# Patient Record
Sex: FEMALE | Race: WHITE | NOT HISPANIC OR LATINO | Employment: OTHER | URBAN - METROPOLITAN AREA
[De-identification: names, ages, dates, MRNs, and addresses within clinical notes are randomized per-mention and may not be internally consistent; named-entity substitution may affect disease eponyms.]

---

## 2017-01-03 ENCOUNTER — TELEPHONE (OUTPATIENT)
Dept: OBSTETRICS AND GYNECOLOGY | Facility: CLINIC | Age: 77
End: 2017-01-03

## 2017-01-03 NOTE — TELEPHONE ENCOUNTER
----- Message from Mariola Palacios sent at 1/3/2017  2:59 PM CST -----  Contact: pt   Patient states she is returning a call Patient can be reached at [phone].

## 2017-01-03 NOTE — TELEPHONE ENCOUNTER
----- Message from Mikayla Olmedo sent at 1/3/2017 12:38 PM CST -----  Contact: pt  x_  1st Request  _  2nd Request  _  3rd Request      Who:pt    Why:  Pt would like  to schedule annual appointment due to her  being ill she haven't been able to make her appointments.    What Number to Call Back: [phone]    When to Expect a call back: (Before the end of the day)   -- if call after 3:00 call back will be tomorrow.

## 2017-01-16 ENCOUNTER — TELEPHONE (OUTPATIENT)
Dept: OBSTETRICS AND GYNECOLOGY | Facility: CLINIC | Age: 77
End: 2017-01-16

## 2017-01-16 NOTE — TELEPHONE ENCOUNTER
Returning a call. Patient requested to be seen another day at an earlier time due to lack of staff at work. Patient scheduled 1/26/17 11:30am. Patient verbalized all understanding. No further questions asked.

## 2017-01-16 NOTE — TELEPHONE ENCOUNTER
----- Message from Karan Knapp sent at 1/16/2017 10:33 AM CST -----  Contact: pt  x_ 1st Request   _ 2nd Request   _ 3rd Request     Who: PATIENCE VENEGAS [[id number]]    Why: Pt is requesting a call back in reference to getting an earlier appointment    What Number to Call Back: [phone]    When to Expect a call back: (Before the end of the day)   -- if call after 3:00 call back will be tomorrow.

## 2017-01-26 ENCOUNTER — OFFICE VISIT (OUTPATIENT)
Dept: OBSTETRICS AND GYNECOLOGY | Facility: CLINIC | Age: 77
End: 2017-01-26
Attending: OBSTETRICS & GYNECOLOGY
Payer: MEDICARE

## 2017-01-26 VITALS
BODY MASS INDEX: 27.22 KG/M2 | WEIGHT: 144.19 LBS | SYSTOLIC BLOOD PRESSURE: 136 MMHG | DIASTOLIC BLOOD PRESSURE: 84 MMHG | HEIGHT: 61 IN

## 2017-01-26 DIAGNOSIS — Z78.0 POST-MENOPAUSAL: ICD-10-CM

## 2017-01-26 DIAGNOSIS — Z12.4 SCREENING FOR MALIGNANT NEOPLASM OF CERVIX: ICD-10-CM

## 2017-01-26 DIAGNOSIS — N95.2 POSTMENOPAUSAL ATROPHIC VAGINITIS: ICD-10-CM

## 2017-01-26 DIAGNOSIS — Z01.419 WELL WOMAN EXAM WITH ROUTINE GYNECOLOGICAL EXAM: Primary | ICD-10-CM

## 2017-01-26 DIAGNOSIS — Z79.890 POSTMENOPAUSAL HORMONE REPLACEMENT THERAPY: ICD-10-CM

## 2017-01-26 PROCEDURE — G0101 CA SCREEN;PELVIC/BREAST EXAM: HCPCS | Mod: S$GLB,,, | Performed by: OBSTETRICS & GYNECOLOGY

## 2017-01-26 PROCEDURE — 99999 PR PBB SHADOW E&M-EST. PATIENT-LVL III: CPT | Mod: PBBFAC,,, | Performed by: OBSTETRICS & GYNECOLOGY

## 2017-01-26 RX ORDER — SUCRALFATE 1 G/1
TABLET ORAL
Refills: 2 | COMMUNITY
Start: 2017-01-06 | End: 2022-04-06

## 2017-01-26 RX ORDER — ONDANSETRON 4 MG/1
TABLET, ORALLY DISINTEGRATING ORAL
COMMUNITY
Start: 2017-01-11 | End: 2020-07-09 | Stop reason: ALTCHOICE

## 2017-01-26 RX ORDER — OMEPRAZOLE 40 MG/1
CAPSULE, DELAYED RELEASE ORAL
Refills: 3 | COMMUNITY
Start: 2017-01-06 | End: 2020-07-09 | Stop reason: ALTCHOICE

## 2017-01-26 NOTE — PROGRESS NOTES
Subjective:     Patient ID: Merle Borden is a 76 y.o. female.     Chief Complaint: No chief complaint on file.     History of Present Illness: This patient is a 76 y.o.female, who presents to the GYN clinic for her gyn well woman yearly exam.  She denies gyn complaints.      No LMP recorded. Patient has had a hysterectomy.    Review of Systems    GENERAL: No fever, chills, fatigability or weightchange  SKIN: No rashes, itching or changes in color or texture of skin.  HEAD: No headaches or recent head trauma.  EYES: Visual acuity fine. No photophobia,r diplopia.  EARS: Denies earache or vertigo  NOSE: No loss of smell, no epistaxis or postnasal drip.  MOUTH & THROAT: No hoarseness or change in voice.   NODES: Denies swollen glands.  CHEST: Denies WHEAT, cyanosis, wheezing, cough and sputum production.  CARDIOVASCULAR: Denies chest pain, PND, orthopnea or reduced exercise tolerance.  ABDOMEN: Appetite fine. No weight loss. bloating, Denies diarrhea, abdominal pain, hematemesis or blood in stool.  URINARY: No flank pain, dysuria or hematuria.  PERIPHERAL VASCULAR: No claudication or cyanosis.Varicosities  MUSCULOSKELETAL: No joint stiffness or swelling. Denies back pain.muscle aches  NEUROLOGIC: No history of seizures, paralysis, alteration of gait or coordination.       Objective:       Physical Exam     APPEARANCE: Well nourished, well developed, in no acute distress.    GENITOURINARY:  Vulva: No lesions. Normal female genital architecture.  Urethral Meatus: Normal size and location, no lesions, no prolapse.  Urethra: No masses, tenderness, prolapse or scarring.  Vagina: thin, atrophic and with decreased rugae, no discharge, no significant cystocele or rectocele.  Cervix: Absent  Uterus: Absent  Adnexa: No masses, tenderness or CDS nodularity.  Anus Perineum: No lesions, no relaxation, no external hemorrhoids.  Breasts: Symmetrical, no skin changes or visible lesions. No palpable masses, nipple discharge or  adenopathy bilaterally.  Abdomen: No masses, tenderness, hernia or ascites, no hepatasplenomegaly  Neck: Supple. Symmetric without masses. No thyromegaly.  Skin: No rashes, lesions, ulcers, acne, hirsutism.  Respiratory: Breath sounds clear bilateraly. Good air movement. No rales. No wheezes.  Cardiovascular: Normal rate. Regular rhythm.  Peripheral/lower extremities: No edema, erythema or tenderness.  Lymphatic: No axillary, neck or groin nodes palp.  Mental Status: Alert, oriented x 3, normal affect and mood.    @PROCEDURE:@           Assessment:      1. Well woman exam with routine gynecological exam    2. Post-menopausal    3. Postmenopausal atrophic vaginitis    4. Postmenopausal vaginal estrogen hormone replacement therapy               Plan:  1.  Discussed mammogram referral patient will call prior to her visit to the [facility] breast center.  2.  No change in GYN regimen.  3.  Return to clinic in 1 year.          No orders of the defined types were placed in this encounter.

## 2017-06-01 DIAGNOSIS — N63.0 LUMP OR MASS IN BREAST: ICD-10-CM

## 2017-06-01 DIAGNOSIS — Z12.31 VISIT FOR SCREENING MAMMOGRAM: Primary | ICD-10-CM

## 2017-06-21 ENCOUNTER — HOSPITAL ENCOUNTER (OUTPATIENT)
Dept: RADIOLOGY | Facility: HOSPITAL | Age: 77
Discharge: HOME | End: 2017-06-21
Attending: INTERNAL MEDICINE
Payer: MEDICARE

## 2017-06-21 DIAGNOSIS — Z12.31 VISIT FOR SCREENING MAMMOGRAM: ICD-10-CM

## 2017-06-21 PROCEDURE — 77067 SCR MAMMO BI INCL CAD: CPT | Mod: TC

## 2017-06-21 PROCEDURE — 77067 SCR MAMMO BI INCL CAD: CPT | Mod: 26,,, | Performed by: RADIOLOGY

## 2018-02-14 ENCOUNTER — TELEPHONE (OUTPATIENT)
Dept: OBSTETRICS AND GYNECOLOGY | Facility: CLINIC | Age: 78
End: 2018-02-14

## 2018-02-14 NOTE — TELEPHONE ENCOUNTER
Needing WWE week of March 19th when daughter in town due to recent surgery and unable to drive. No appts available at this time but will call pt if one becomes available.

## 2018-02-14 NOTE — TELEPHONE ENCOUNTER
----- Message from Yaneth Doe sent at 2/14/2018 11:36 AM CST -----  Contact: pt  x_ 1st Request  _ 2nd Request  _ 3rd Request    Who: pt    Why: is returning a call    What Number to Call Back: [phone]    When to Expect a call back: (Before the end of the day)  -- if call after 3:00 call back will be tomorrow.

## 2018-02-14 NOTE — TELEPHONE ENCOUNTER
----- Message from Sierra Mcmanus sent at 2/14/2018 11:15 AM CST -----  Contact: Patient   X _1st Request  _  2nd Request  _  3rd Request    Who:PATIENCE VENEGAS [[id number]]    Why:Patient states she needs to speak with kimberly concerning scheduling a annual exam she has surgery in her arm and can't drive but her daughter is coming in town and can drive her she is requesting a call back     What Number to Call Back:[phone]    When to Expect a call back: (Before the end of the day)   -- if call after 3:00 call back will be tomorrow.

## 2018-02-21 ENCOUNTER — TELEPHONE (OUTPATIENT)
Dept: OBSTETRICS AND GYNECOLOGY | Facility: CLINIC | Age: 78
End: 2018-02-21

## 2018-02-21 NOTE — TELEPHONE ENCOUNTER
----- Message from Sierra Mcmanus sent at 2/21/2018 10:28 AM CST -----  Contact: Patient   X _1st Request  _  2nd Request  _  3rd Request    Who:PATIENCE VENEGAS [[id number]]    Why:Patient was returning a missed call back from the staff     What Number to Call Back:[phone]    When to Expect a call back: (Before the end of the day)   -- if call after 3:00 call back will be tomorrow.

## 2018-04-05 ENCOUNTER — OFFICE VISIT (OUTPATIENT)
Dept: OBSTETRICS AND GYNECOLOGY | Facility: CLINIC | Age: 78
End: 2018-04-05
Attending: OBSTETRICS & GYNECOLOGY
Payer: MEDICARE

## 2018-04-05 VITALS
SYSTOLIC BLOOD PRESSURE: 140 MMHG | WEIGHT: 143.75 LBS | DIASTOLIC BLOOD PRESSURE: 80 MMHG | HEIGHT: 61 IN | BODY MASS INDEX: 27.14 KG/M2

## 2018-04-05 DIAGNOSIS — N95.2 POSTMENOPAUSAL ATROPHIC VAGINITIS: ICD-10-CM

## 2018-04-05 DIAGNOSIS — Z01.419 WELL WOMAN EXAM WITH ROUTINE GYNECOLOGICAL EXAM: Primary | ICD-10-CM

## 2018-04-05 DIAGNOSIS — Z12.31 SCREENING MAMMOGRAM, ENCOUNTER FOR: ICD-10-CM

## 2018-04-05 DIAGNOSIS — Z79.890 POSTMENOPAUSAL HORMONE REPLACEMENT THERAPY: ICD-10-CM

## 2018-04-05 DIAGNOSIS — Z78.0 POST-MENOPAUSAL: ICD-10-CM

## 2018-04-05 PROCEDURE — 99999 PR PBB SHADOW E&M-EST. PATIENT-LVL II: CPT | Mod: PBBFAC,,, | Performed by: OBSTETRICS & GYNECOLOGY

## 2018-04-05 PROCEDURE — G0101 CA SCREEN;PELVIC/BREAST EXAM: HCPCS | Mod: S$GLB,,, | Performed by: OBSTETRICS & GYNECOLOGY

## 2018-04-05 RX ORDER — KETOROLAC TROMETHAMINE 5 MG/ML
SOLUTION OPHTHALMIC
COMMUNITY
Start: 2018-03-08 | End: 2022-04-06 | Stop reason: ALTCHOICE

## 2018-04-05 RX ORDER — CIPROFLOXACIN HYDROCHLORIDE 3 MG/ML
SOLUTION/ DROPS OPHTHALMIC
COMMUNITY
Start: 2018-03-08 | End: 2020-07-09 | Stop reason: ALTCHOICE

## 2018-04-05 RX ORDER — PREDNISOLONE ACETATE 10 MG/ML
SUSPENSION/ DROPS OPHTHALMIC
COMMUNITY
Start: 2018-03-08 | End: 2020-07-09 | Stop reason: ALTCHOICE

## 2018-06-26 ENCOUNTER — HOSPITAL ENCOUNTER (OUTPATIENT)
Dept: RADIOLOGY | Facility: HOSPITAL | Age: 78
Discharge: HOME | End: 2018-06-26
Attending: OBSTETRICS & GYNECOLOGY
Payer: MEDICARE

## 2018-06-26 DIAGNOSIS — Z01.419 WELL WOMAN EXAM WITH ROUTINE GYNECOLOGICAL EXAM: ICD-10-CM

## 2018-06-26 DIAGNOSIS — Z78.0 POST-MENOPAUSAL: ICD-10-CM

## 2018-06-26 DIAGNOSIS — N95.2 POSTMENOPAUSAL ATROPHIC VAGINITIS: ICD-10-CM

## 2018-06-26 DIAGNOSIS — Z12.31 SCREENING MAMMOGRAM, ENCOUNTER FOR: ICD-10-CM

## 2018-06-26 DIAGNOSIS — Z79.890 POSTMENOPAUSAL HORMONE REPLACEMENT THERAPY: ICD-10-CM

## 2018-06-26 PROCEDURE — 77067 SCR MAMMO BI INCL CAD: CPT | Mod: 26,,, | Performed by: RADIOLOGY

## 2018-06-26 PROCEDURE — 77067 SCR MAMMO BI INCL CAD: CPT | Mod: TC

## 2018-06-26 PROCEDURE — 77063 BREAST TOMOSYNTHESIS BI: CPT | Mod: 26,,, | Performed by: RADIOLOGY

## 2019-06-10 ENCOUNTER — TELEPHONE (OUTPATIENT)
Dept: OBSTETRICS AND GYNECOLOGY | Facility: CLINIC | Age: 79
End: 2019-06-10

## 2019-06-10 DIAGNOSIS — Z12.31 SCREENING MAMMOGRAM, ENCOUNTER FOR: Primary | ICD-10-CM

## 2019-06-10 NOTE — TELEPHONE ENCOUNTER
----- Message from Hussain Mccormack sent at 6/10/2019 12:53 PM CDT -----  Contact: Pt  Needs Advice    Reason for call:The caller would like to get her Mammo but doesn't have any order/referral in the system.  Could you send in the order/referral for the Pt and call her after it has been sent over so that she will know to call us to schedule.        Communication Preference: Cell [phone]  Or  Home  [phone]    Additional Information:

## 2019-06-27 ENCOUNTER — OFFICE VISIT (OUTPATIENT)
Dept: OBSTETRICS AND GYNECOLOGY | Facility: CLINIC | Age: 79
End: 2019-06-27
Attending: OBSTETRICS & GYNECOLOGY
Payer: MEDICARE

## 2019-06-27 VITALS
HEIGHT: 61 IN | DIASTOLIC BLOOD PRESSURE: 84 MMHG | WEIGHT: 144.81 LBS | BODY MASS INDEX: 27.34 KG/M2 | SYSTOLIC BLOOD PRESSURE: 126 MMHG

## 2019-06-27 DIAGNOSIS — Z01.419 WELL WOMAN EXAM WITH ROUTINE GYNECOLOGICAL EXAM: Primary | ICD-10-CM

## 2019-06-27 DIAGNOSIS — Z79.890 POSTMENOPAUSAL HORMONE REPLACEMENT THERAPY: ICD-10-CM

## 2019-06-27 DIAGNOSIS — Z12.31 SCREENING MAMMOGRAM, ENCOUNTER FOR: ICD-10-CM

## 2019-06-27 DIAGNOSIS — N81.10 CYSTOCELE, UNSPECIFIED (CODE): ICD-10-CM

## 2019-06-27 DIAGNOSIS — Z78.0 POST-MENOPAUSAL: ICD-10-CM

## 2019-06-27 DIAGNOSIS — N39.41 URGE INCONTINENCE OF URINE: ICD-10-CM

## 2019-06-27 PROCEDURE — G0101 CA SCREEN;PELVIC/BREAST EXAM: HCPCS | Mod: S$GLB,,, | Performed by: OBSTETRICS & GYNECOLOGY

## 2019-06-27 PROCEDURE — 99999 PR PBB SHADOW E&M-EST. PATIENT-LVL III: CPT | Mod: PBBFAC,,, | Performed by: OBSTETRICS & GYNECOLOGY

## 2019-06-27 PROCEDURE — 99999 PR PBB SHADOW E&M-EST. PATIENT-LVL III: ICD-10-PCS | Mod: PBBFAC,,, | Performed by: OBSTETRICS & GYNECOLOGY

## 2019-06-27 PROCEDURE — G0101 PR CA SCREEN;PELVIC/BREAST EXAM: ICD-10-PCS | Mod: S$GLB,,, | Performed by: OBSTETRICS & GYNECOLOGY

## 2019-06-27 RX ORDER — ESTRADIOL 10 UG/1
1 INSERT VAGINAL
Qty: 12 TABLET | Refills: 11 | Status: SHIPPED | OUTPATIENT
Start: 2019-06-28 | End: 2021-08-19

## 2019-06-27 RX ORDER — BUDESONIDE AND FORMOTEROL FUMARATE DIHYDRATE 80; 4.5 UG/1; UG/1
AEROSOL RESPIRATORY (INHALATION)
Refills: 5 | COMMUNITY
Start: 2019-04-03

## 2019-06-27 RX ORDER — ALPRAZOLAM 0.5 MG/1
0.5 TABLET ORAL DAILY
Refills: 5 | COMMUNITY
Start: 2019-06-20 | End: 2023-09-06

## 2019-06-27 RX ORDER — SERTRALINE HYDROCHLORIDE 50 MG/1
TABLET, FILM COATED ORAL
COMMUNITY
Start: 2019-04-01 | End: 2020-07-09 | Stop reason: ALTCHOICE

## 2019-06-27 NOTE — PROGRESS NOTES
Subjective:     Patient ID: Merle Borden is a 79 y.o. female.     Chief Complaint: Well Woman     History of Present Illness: This patient is a 79 y.o.  female, who presents to the GYN clinic for her gyn well woman yearly exam.  She complains of occasional incontinence when her bladder is full.  Because of this problem she wears a pad.        No LMP recorded. Patient has had a hysterectomy.    Review of Systems    GENERAL: No fever, chills, fatigability or weightchange  SKIN: No rashes, itching or changes in color or texture of skin.  HEAD: No headaches or recent head trauma.  EYES: Visual acuity fine. No photophobia,r diplopia.  EARS: Denies earache or vertigo  NOSE: No loss of smell, no epistaxis or postnasal drip.  MOUTH & THROAT: No hoarseness or change in voice.   NODES: Denies swollen glands.  CHEST: Denies WHEAT, cyanosis, wheezing, cough and sputum production.  CARDIOVASCULAR: Denies chest pain, PND, orthopnea or reduced exercise tolerance.  ABDOMEN: Appetite fine. No weight loss. bloating, Denies diarrhea, abdominal pain, hematemesis or blood in stool.  URINARY: No flank pain, dysuria or hematuria.  PERIPHERAL VASCULAR: No claudication or cyanosis.Varicosities  MUSCULOSKELETAL: No joint stiffness or swelling. Denies back pain.muscle aches  NEUROLOGIC: No history of seizures, paralysis, alteration of gait or coordination.       Objective:       Physical Exam     APPEARANCE: Well nourished, well developed, in no acute distress.    GENITOURINARY:  Vulva: No lesions. Normal female genital architecture.  Urethral Meatus: Normal size and location, no lesions, no prolapse.  Urethra: No masses, tenderness, prolapse or scarring.  Vagina: t moist and with decreased rugae, no discharge, midline cystocele small rectocele.  Cervix: Absent  Uterus: Absent  Adnexa: No masses, tenderness or CDS nodularity.  Anus Perineum: No lesions, no relaxation, no external hemorrhoids.  Breasts: Symmetrical, no skin changes or  visible lesions. No palpable masses, nipple discharge or adenopathy bilaterally.  Abdomen: No masses, tenderness, hernia or ascites, no hepatasplenomegaly  Neck: Supple. Symmetric without masses. No thyromegaly.  Skin: No rashes, lesions, ulcers, acne, hirsutism.  Respiratory: Breath sounds clear bilateraly. Good air movement. No rales. No wheezes.  Cardiovascular: Normal rate. Regular rhythm.  Peripheral/lower extremities: No edema, erythema or tenderness.  Lymphatic: No axillary, neck or groin nodes palp.  Mental Status: Alert, oriented x 3, normal affect and mood.    @PROCEDURE:@           Assessment:      1. Well woman exam with routine gynecological exam    2. Post-menopausal    3. Postmenopausal vaginal estrogen hormone replacement therapy    4. Screening mammogram, encounter for    5. Urge incontinence of urine    6. Cystocele, unspecified (CODE)               Plan:  1.  Patient wishes to continue vaginal film estrogen therapy.  2.  Discuss referral to the Gyne urology team for their evaluation and recommendations for the patient's urge incontinence problem.  Patient does not wish to see the going to urologist at this time.  3.  Mammogram scheduled.  4.  Return to clinic for well-woman exam.                    No orders of the defined types were placed in this encounter.

## 2019-06-28 ENCOUNTER — HOSPITAL ENCOUNTER (OUTPATIENT)
Dept: RADIOLOGY | Facility: HOSPITAL | Age: 79
Discharge: HOME | End: 2019-06-28
Attending: OBSTETRICS & GYNECOLOGY
Payer: MEDICARE

## 2019-06-28 DIAGNOSIS — Z12.31 SCREENING MAMMOGRAM, ENCOUNTER FOR: ICD-10-CM

## 2019-06-28 PROCEDURE — 77067 SCR MAMMO BI INCL CAD: CPT | Mod: 26,,, | Performed by: RADIOLOGY

## 2019-06-28 PROCEDURE — 77063 MAMMO DIGITAL SCREENING BILAT WITH TOMOSYNTHESIS_CAD: ICD-10-PCS | Mod: 26,,, | Performed by: RADIOLOGY

## 2019-06-28 PROCEDURE — 77063 BREAST TOMOSYNTHESIS BI: CPT | Mod: 26,,, | Performed by: RADIOLOGY

## 2019-06-28 PROCEDURE — 77067 MAMMO DIGITAL SCREENING BILAT WITH TOMOSYNTHESIS_CAD: ICD-10-PCS | Mod: 26,,, | Performed by: RADIOLOGY

## 2019-06-28 PROCEDURE — 77067 SCR MAMMO BI INCL CAD: CPT | Mod: TC

## 2020-06-04 ENCOUNTER — TELEPHONE (OUTPATIENT)
Dept: OBSTETRICS AND GYNECOLOGY | Facility: CLINIC | Age: 80
End: 2020-06-04

## 2020-06-04 DIAGNOSIS — Z12.31 SCREENING MAMMOGRAM, ENCOUNTER FOR: Primary | ICD-10-CM

## 2020-06-04 NOTE — TELEPHONE ENCOUNTER
----- Message from Jie More sent at 6/4/2020  1:41 PM CDT -----  Contact: pt   Name of Who is Calling: PATIENCE VENEGAS [[id number]]    What is the request in detail: Patient needs mammo orders  Please contact to further discuss and advise      Can the clinic reply by [facility]:     What Number to Call Back if not in [facility]:  [phone] (home) Can the clinic reply in [facility]:

## 2020-07-09 ENCOUNTER — OFFICE VISIT (OUTPATIENT)
Dept: OBSTETRICS AND GYNECOLOGY | Facility: CLINIC | Age: 80
End: 2020-07-09
Attending: OBSTETRICS & GYNECOLOGY
Payer: MEDICARE

## 2020-07-09 ENCOUNTER — HOSPITAL ENCOUNTER (OUTPATIENT)
Dept: RADIOLOGY | Facility: OTHER | Age: 80
Discharge: HOME | End: 2020-07-09
Attending: OBSTETRICS & GYNECOLOGY
Payer: MEDICARE

## 2020-07-09 VITALS
WEIGHT: 145.5 LBS | HEIGHT: 61 IN | DIASTOLIC BLOOD PRESSURE: 70 MMHG | BODY MASS INDEX: 27.47 KG/M2 | SYSTOLIC BLOOD PRESSURE: 128 MMHG

## 2020-07-09 DIAGNOSIS — Z12.31 SCREENING MAMMOGRAM, ENCOUNTER FOR: ICD-10-CM

## 2020-07-09 DIAGNOSIS — Z78.0 POST-MENOPAUSAL: ICD-10-CM

## 2020-07-09 DIAGNOSIS — N81.10 CYSTOCELE, UNSPECIFIED (CODE): ICD-10-CM

## 2020-07-09 DIAGNOSIS — Z79.890 POSTMENOPAUSAL HORMONE REPLACEMENT THERAPY: ICD-10-CM

## 2020-07-09 DIAGNOSIS — Z01.419 WELL WOMAN EXAM WITH ROUTINE GYNECOLOGICAL EXAM: Primary | ICD-10-CM

## 2020-07-09 PROCEDURE — 77067 MAMMO DIGITAL SCREENING BILAT WITH TOMOSYNTHESIS_CAD: ICD-10-PCS | Mod: 26,,, | Performed by: RADIOLOGY

## 2020-07-09 PROCEDURE — 99999 PR PBB SHADOW E&M-EST. PATIENT-LVL III: ICD-10-PCS | Mod: PBBFAC,,, | Performed by: OBSTETRICS & GYNECOLOGY

## 2020-07-09 PROCEDURE — 77067 SCR MAMMO BI INCL CAD: CPT | Mod: 26,,, | Performed by: RADIOLOGY

## 2020-07-09 PROCEDURE — 99999 PR PBB SHADOW E&M-EST. PATIENT-LVL III: CPT | Mod: PBBFAC,,, | Performed by: OBSTETRICS & GYNECOLOGY

## 2020-07-09 PROCEDURE — G0101 CA SCREEN;PELVIC/BREAST EXAM: HCPCS | Mod: S$GLB,,, | Performed by: OBSTETRICS & GYNECOLOGY

## 2020-07-09 PROCEDURE — G0101 PR CA SCREEN;PELVIC/BREAST EXAM: ICD-10-PCS | Mod: S$GLB,,, | Performed by: OBSTETRICS & GYNECOLOGY

## 2020-07-09 PROCEDURE — 77067 SCR MAMMO BI INCL CAD: CPT | Mod: TC

## 2020-07-09 PROCEDURE — 77063 BREAST TOMOSYNTHESIS BI: CPT | Mod: 26,,, | Performed by: RADIOLOGY

## 2020-07-09 PROCEDURE — 77063 MAMMO DIGITAL SCREENING BILAT WITH TOMOSYNTHESIS_CAD: ICD-10-PCS | Mod: 26,,, | Performed by: RADIOLOGY

## 2020-07-09 RX ORDER — ALBUTEROL SULFATE 90 UG/1
2 AEROSOL, METERED RESPIRATORY (INHALATION)
COMMUNITY
Start: 2020-06-29 | End: 2022-04-20

## 2020-07-09 RX ORDER — IRBESARTAN 150 MG/1
150 TABLET ORAL
COMMUNITY
Start: 2020-05-15 | End: 2022-04-06

## 2020-07-09 RX ORDER — SERTRALINE HYDROCHLORIDE 50 MG/1
100 TABLET, FILM COATED ORAL
COMMUNITY
Start: 2020-05-15 | End: 2022-04-06

## 2020-07-09 RX ORDER — ALENDRONATE SODIUM 70 MG/1
70 TABLET ORAL
COMMUNITY
Start: 2020-05-15

## 2020-07-09 NOTE — PROGRESS NOTES
Subjective:     Patient ID: Merle Borden is a 80 y.o. female.     Chief Complaint: No chief complaint on file.     History of Present Illness: This patient is a 80 y.o.  female, who presents to the GYN clinic for her gyn well woman yearly exam.  She complains of urinary incontinence and wears a pad because of the incontinence..  She does not wish to be referred to Gynourology for evaluation and possible treatment..      No LMP recorded. Patient has had a hysterectomy.    Review of Systems    GENERAL: No fever, chills, fatigability or weightchange  SKIN: No rashes, itching or changes in color or texture of skin.  HEAD: No headaches or recent head trauma.  EYES: Visual acuity fine. No photophobia,r diplopia.  EARS: Denies earache or vertigo  NOSE: No loss of smell, no epistaxis or postnasal drip.  MOUTH & THROAT: No hoarseness or change in voice.   NODES: Denies swollen glands.  CHEST: Denies WHEAT, cyanosis, wheezing, cough and sputum production.  CARDIOVASCULAR: Denies chest pain, PND, orthopnea or reduced exercise tolerance.  ABDOMEN: Appetite fine. No weight loss. bloating, Denies diarrhea, abdominal pain, hematemesis or blood in stool.  URINARY: No flank pain, dysuria or hematuria.  PERIPHERAL VASCULAR: No claudication or cyanosis.Varicosities  MUSCULOSKELETAL: No joint stiffness or swelling. Denies back pain.muscle aches  NEUROLOGIC: No history of seizures, paralysis, alteration of gait or coordination.       Objective:       Physical Exam     APPEARANCE: Well nourished, well developed, in no acute distress.    GENITOURINARY:  Vulva: No lesions. Normal female genital architecture.  Urethral Meatus: Normal size and location, no lesions, no prolapse.  Urethra: No masses, tenderness, prolapse or scarring.  Vagina: thin, atrophic and with decreased rugae, no discharge, cystocele and rectocele.noted.   Cervix: Absent  Uterus: Absent  Adnexa: No masses, tenderness or CDS nodularity.  Anus Perineum: No lesions,  no relaxation, no external hemorrhoids.  Breasts: Symmetrical, no skin changes or visible lesions. No palpable masses, nipple discharge or adenopathy bilaterally.  Abdomen: No masses, tenderness, hernia or ascites, no hepatasplenomegaly  Neck: Supple. Symmetric without masses. No thyromegaly.  Skin: No rashes, lesions, ulcers, acne, hirsutism.  Respiratory: Breath sounds clear bilateraly. Good air movement. No rales. No wheezes.  Cardiovascular: Normal rate. Regular rhythm.  Peripheral/lower extremities: No edema, erythema or tenderness.  Lymphatic: No axillary, neck or groin nodes palp.  Mental Status: Alert, oriented x 3, normal affect and mood.    @PROCEDURE:@           Assessment:      1. Well woman exam with routine gynecological exam    2. Post-menopausal    3. Postmenopausal vaginal estrogen hormone replacement therapy    4. Screening mammogram, encounter for    5. Cystocele, unspecified (CODE)               Plan:  1.  Mammogram ordered.  2.  No changes in gyn regimen.  3.  Discussed referral to gynourology the patient does not wish to be referred at this time.  She recall the office if she wishes the referral.  4.  Return to clinic for well-woman exam.                            No orders of the defined types were placed in this encounter.

## 2020-09-19 NOTE — PROGRESS NOTES
Subjective:     Patient ID: Merle Borden is a 77 y.o. female.     Chief Complaint: Well Woman     History of Present Illness: This patient is a 77 y.o.female, who presents to the GYN clinic for her gyn well woman yearly exam.  She denies gyn complaints.  She uses Vagifem for vaginal hormone replacement.      No LMP recorded. Patient has had a hysterectomy.    Review of Systems    GENERAL: No fever, chills, fatigability or weightchange  SKIN: No rashes, itching or changes in color or texture of skin.  HEAD: No headaches or recent head trauma.  EYES: Visual acuity fine. No photophobia,r diplopia.  EARS: Denies earache or vertigo  NOSE: No loss of smell, no epistaxis or postnasal drip.  MOUTH & THROAT: No hoarseness or change in voice.   NODES: Denies swollen glands.  CHEST: Denies WHEAT, cyanosis, wheezing, cough and sputum production.  CARDIOVASCULAR: Denies chest pain, PND, orthopnea or reduced exercise tolerance.  ABDOMEN: Appetite fine. No weight loss. bloating, Denies diarrhea, abdominal pain, hematemesis or blood in stool.  URINARY: No flank pain, dysuria or hematuria.  PERIPHERAL VASCULAR: No claudication or cyanosis.Varicosities  MUSCULOSKELETAL: No joint stiffness or swelling. Denies back pain.muscle aches  NEUROLOGIC: No history of seizures, paralysis, alteration of gait or coordination.       Objective:       Physical Exam     APPEARANCE: Well nourished, well developed, in no acute distress.    GENITOURINARY:  Vulva: No lesions. Normal female genital architecture.  Urethral Meatus: Normal size and location, no lesions, no prolapse.  Urethra: No masses, tenderness, prolapse or scarring.  Vagina: thin, atrophic and with decreased rugae, no discharge, no significant cystocele or rectocele.  Cervix: Absent  Uterus: Absent  Adnexa: No masses, tenderness or CDS nodularity.  Anus Perineum: No lesions, no relaxation, no external hemorrhoids.  Breasts: Symmetrical, no skin changes or visible lesions. No  palpable masses, nipple discharge or adenopathy bilaterally.  Abdomen: No masses, tenderness, hernia or ascites, no hepatasplenomegaly  Neck: Supple. Symmetric without masses. No thyromegaly.  Skin: No rashes, lesions, ulcers, acne, hirsutism.  Respiratory: Breath sounds clear bilateraly. Good air movement. No rales. No wheezes.  Cardiovascular: Normal rate. Regular rhythm.  Peripheral/lower extremities: No edema, erythema or tenderness.  Lymphatic: No axillary, neck or groin nodes palp.  Mental Status: Alert, oriented x 3, normal affect and mood.    @PROCEDURE:@           Assessment:      1. Well woman exam with routine gynecological exam    2. Post-menopausal    3. Postmenopausal vaginal estrogen hormone replacement therapy    4. Screening mammogram, encounter for    5. Postmenopausal atrophic vaginitis               Plan:  1.  Mammogram scheduled for June.  2.  No change in GYN regimen.  3.  Return to clinic for well woman exam.              No orders of the defined types were placed in this encounter.            Pt to consume >65% of meal tray in 3 days

## 2021-07-29 ENCOUNTER — TELEPHONE (OUTPATIENT)
Dept: OBSTETRICS AND GYNECOLOGY | Facility: CLINIC | Age: 81
End: 2021-07-29

## 2021-07-29 DIAGNOSIS — Z12.31 ENCOUNTER FOR SCREENING MAMMOGRAM FOR MALIGNANT NEOPLASM OF BREAST: Primary | ICD-10-CM

## 2021-08-19 ENCOUNTER — HOSPITAL ENCOUNTER (OUTPATIENT)
Dept: RADIOLOGY | Facility: OTHER | Age: 81
Discharge: HOME | End: 2021-08-19
Attending: OBSTETRICS & GYNECOLOGY
Payer: MEDICARE

## 2021-08-19 ENCOUNTER — OFFICE VISIT (OUTPATIENT)
Dept: OBSTETRICS AND GYNECOLOGY | Facility: CLINIC | Age: 81
End: 2021-08-19
Attending: OBSTETRICS & GYNECOLOGY
Payer: MEDICARE

## 2021-08-19 VITALS
DIASTOLIC BLOOD PRESSURE: 76 MMHG | SYSTOLIC BLOOD PRESSURE: 126 MMHG | WEIGHT: 157.44 LBS | BODY MASS INDEX: 29.72 KG/M2 | HEIGHT: 61 IN

## 2021-08-19 DIAGNOSIS — N95.2 POSTMENOPAUSAL ATROPHIC VAGINITIS: ICD-10-CM

## 2021-08-19 DIAGNOSIS — Z01.419 WELL WOMAN EXAM WITH ROUTINE GYNECOLOGICAL EXAM: Primary | ICD-10-CM

## 2021-08-19 DIAGNOSIS — Z12.31 SCREENING MAMMOGRAM, ENCOUNTER FOR: ICD-10-CM

## 2021-08-19 DIAGNOSIS — N81.10 CYSTOCELE, UNSPECIFIED (CODE): ICD-10-CM

## 2021-08-19 DIAGNOSIS — Z78.0 POST-MENOPAUSAL: ICD-10-CM

## 2021-08-19 DIAGNOSIS — Z12.31 ENCOUNTER FOR SCREENING MAMMOGRAM FOR MALIGNANT NEOPLASM OF BREAST: ICD-10-CM

## 2021-08-19 PROCEDURE — 1100F PR PT FALLS ASSESS DOC 2+ FALLS/FALL W/INJURY/YR: ICD-10-PCS | Mod: CPTII,S$GLB,, | Performed by: OBSTETRICS & GYNECOLOGY

## 2021-08-19 PROCEDURE — 77067 MAMMO DIGITAL SCREENING BILAT WITH TOMO: ICD-10-PCS | Mod: 26,,, | Performed by: RADIOLOGY

## 2021-08-19 PROCEDURE — 1159F PR MEDICATION LIST DOCUMENTED IN MEDICAL RECORD: ICD-10-PCS | Mod: CPTII,S$GLB,, | Performed by: OBSTETRICS & GYNECOLOGY

## 2021-08-19 PROCEDURE — G0101 PR CA SCREEN;PELVIC/BREAST EXAM: ICD-10-PCS | Mod: S$GLB,,, | Performed by: OBSTETRICS & GYNECOLOGY

## 2021-08-19 PROCEDURE — 77063 BREAST TOMOSYNTHESIS BI: CPT | Mod: 26,,, | Performed by: RADIOLOGY

## 2021-08-19 PROCEDURE — 1100F PTFALLS ASSESS-DOCD GE2>/YR: CPT | Mod: CPTII,S$GLB,, | Performed by: OBSTETRICS & GYNECOLOGY

## 2021-08-19 PROCEDURE — 1126F AMNT PAIN NOTED NONE PRSNT: CPT | Mod: CPTII,S$GLB,, | Performed by: OBSTETRICS & GYNECOLOGY

## 2021-08-19 PROCEDURE — 99999 PR PBB SHADOW E&M-EST. PATIENT-LVL III: ICD-10-PCS | Mod: PBBFAC,,, | Performed by: OBSTETRICS & GYNECOLOGY

## 2021-08-19 PROCEDURE — 77067 SCR MAMMO BI INCL CAD: CPT | Mod: TC

## 2021-08-19 PROCEDURE — 3074F PR MOST RECENT SYSTOLIC BLOOD PRESSURE < 130 MM HG: ICD-10-PCS | Mod: CPTII,S$GLB,, | Performed by: OBSTETRICS & GYNECOLOGY

## 2021-08-19 PROCEDURE — 77063 MAMMO DIGITAL SCREENING BILAT WITH TOMO: ICD-10-PCS | Mod: 26,,, | Performed by: RADIOLOGY

## 2021-08-19 PROCEDURE — 1126F PR PAIN SEVERITY QUANTIFIED, NO PAIN PRESENT: ICD-10-PCS | Mod: CPTII,S$GLB,, | Performed by: OBSTETRICS & GYNECOLOGY

## 2021-08-19 PROCEDURE — 99999 PR PBB SHADOW E&M-EST. PATIENT-LVL III: CPT | Mod: PBBFAC,,, | Performed by: OBSTETRICS & GYNECOLOGY

## 2021-08-19 PROCEDURE — 3078F PR MOST RECENT DIASTOLIC BLOOD PRESSURE < 80 MM HG: ICD-10-PCS | Mod: CPTII,S$GLB,, | Performed by: OBSTETRICS & GYNECOLOGY

## 2021-08-19 PROCEDURE — 3074F SYST BP LT 130 MM HG: CPT | Mod: CPTII,S$GLB,, | Performed by: OBSTETRICS & GYNECOLOGY

## 2021-08-19 PROCEDURE — 77067 SCR MAMMO BI INCL CAD: CPT | Mod: 26,,, | Performed by: RADIOLOGY

## 2021-08-19 PROCEDURE — 1160F RVW MEDS BY RX/DR IN RCRD: CPT | Mod: CPTII,S$GLB,, | Performed by: OBSTETRICS & GYNECOLOGY

## 2021-08-19 PROCEDURE — 3078F DIAST BP <80 MM HG: CPT | Mod: CPTII,S$GLB,, | Performed by: OBSTETRICS & GYNECOLOGY

## 2021-08-19 PROCEDURE — 1160F PR REVIEW ALL MEDS BY PRESCRIBER/CLIN PHARMACIST DOCUMENTED: ICD-10-PCS | Mod: CPTII,S$GLB,, | Performed by: OBSTETRICS & GYNECOLOGY

## 2021-08-19 PROCEDURE — G0101 CA SCREEN;PELVIC/BREAST EXAM: HCPCS | Mod: S$GLB,,, | Performed by: OBSTETRICS & GYNECOLOGY

## 2021-08-19 PROCEDURE — 3288F FALL RISK ASSESSMENT DOCD: CPT | Mod: CPTII,S$GLB,, | Performed by: OBSTETRICS & GYNECOLOGY

## 2021-08-19 PROCEDURE — 3288F PR FALLS RISK ASSESSMENT DOCUMENTED: ICD-10-PCS | Mod: CPTII,S$GLB,, | Performed by: OBSTETRICS & GYNECOLOGY

## 2021-08-19 PROCEDURE — 1159F MED LIST DOCD IN RCRD: CPT | Mod: CPTII,S$GLB,, | Performed by: OBSTETRICS & GYNECOLOGY

## 2021-08-19 RX ORDER — HYDROCHLOROTHIAZIDE 25 MG/1
25 TABLET ORAL DAILY
COMMUNITY
End: 2022-04-06

## 2021-08-19 RX ORDER — MIRABEGRON 50 MG/1
TABLET, FILM COATED, EXTENDED RELEASE ORAL
COMMUNITY
End: 2022-04-06

## 2021-08-20 ENCOUNTER — TELEPHONE (OUTPATIENT)
Dept: OBSTETRICS AND GYNECOLOGY | Facility: CLINIC | Age: 81
End: 2021-08-20

## 2022-04-06 ENCOUNTER — OFFICE VISIT (OUTPATIENT)
Dept: CARDIOLOGY | Facility: CLINIC | Age: 82
End: 2022-04-06
Payer: MEDICARE

## 2022-04-06 VITALS
HEART RATE: 69 BPM | HEIGHT: 61 IN | BODY MASS INDEX: 30.6 KG/M2 | DIASTOLIC BLOOD PRESSURE: 73 MMHG | SYSTOLIC BLOOD PRESSURE: 146 MMHG | WEIGHT: 162.06 LBS

## 2022-04-06 DIAGNOSIS — I50.31 ACUTE DIASTOLIC HEART FAILURE: ICD-10-CM

## 2022-04-06 DIAGNOSIS — I10 PRIMARY HYPERTENSION: ICD-10-CM

## 2022-04-06 DIAGNOSIS — I50.31 ACUTE DIASTOLIC HEART FAILURE: Primary | ICD-10-CM

## 2022-04-06 DIAGNOSIS — Z01.818 PREOPERATIVE CLEARANCE: ICD-10-CM

## 2022-04-06 PROBLEM — J45.909 ASTHMA DUE TO INTERNAL IMMUNOLOGICAL PROCESS: Status: ACTIVE | Noted: 2022-04-06

## 2022-04-06 PROBLEM — E78.00 HYPERCHOLESTEROLEMIA: Status: ACTIVE | Noted: 2022-04-06

## 2022-04-06 PROBLEM — E66.9 OBESITY: Status: ACTIVE | Noted: 2022-04-06

## 2022-04-06 PROBLEM — S06.5XAA SUBDURAL HEMATOMA: Status: ACTIVE | Noted: 2022-04-06

## 2022-04-06 PROCEDURE — 93000 EKG 12-LEAD: ICD-10-PCS | Mod: S$GLB,,, | Performed by: INTERNAL MEDICINE

## 2022-04-06 PROCEDURE — 3288F FALL RISK ASSESSMENT DOCD: CPT | Mod: CPTII,S$GLB,, | Performed by: INTERNAL MEDICINE

## 2022-04-06 PROCEDURE — 93000 ELECTROCARDIOGRAM COMPLETE: CPT | Mod: S$GLB,,, | Performed by: INTERNAL MEDICINE

## 2022-04-06 PROCEDURE — 3078F PR MOST RECENT DIASTOLIC BLOOD PRESSURE < 80 MM HG: ICD-10-PCS | Mod: CPTII,S$GLB,, | Performed by: INTERNAL MEDICINE

## 2022-04-06 PROCEDURE — 1159F MED LIST DOCD IN RCRD: CPT | Mod: CPTII,S$GLB,, | Performed by: INTERNAL MEDICINE

## 2022-04-06 PROCEDURE — 3078F DIAST BP <80 MM HG: CPT | Mod: CPTII,S$GLB,, | Performed by: INTERNAL MEDICINE

## 2022-04-06 PROCEDURE — 1100F PR PT FALLS ASSESS DOC 2+ FALLS/FALL W/INJURY/YR: ICD-10-PCS | Mod: CPTII,S$GLB,, | Performed by: INTERNAL MEDICINE

## 2022-04-06 PROCEDURE — 99204 PR OFFICE/OUTPT VISIT, NEW, LEVL IV, 45-59 MIN: ICD-10-PCS | Mod: S$GLB,,, | Performed by: INTERNAL MEDICINE

## 2022-04-06 PROCEDURE — 1100F PTFALLS ASSESS-DOCD GE2>/YR: CPT | Mod: CPTII,S$GLB,, | Performed by: INTERNAL MEDICINE

## 2022-04-06 PROCEDURE — 1126F PR PAIN SEVERITY QUANTIFIED, NO PAIN PRESENT: ICD-10-PCS | Mod: CPTII,S$GLB,, | Performed by: INTERNAL MEDICINE

## 2022-04-06 PROCEDURE — 3077F SYST BP >= 140 MM HG: CPT | Mod: CPTII,S$GLB,, | Performed by: INTERNAL MEDICINE

## 2022-04-06 PROCEDURE — 1126F AMNT PAIN NOTED NONE PRSNT: CPT | Mod: CPTII,S$GLB,, | Performed by: INTERNAL MEDICINE

## 2022-04-06 PROCEDURE — 3288F PR FALLS RISK ASSESSMENT DOCUMENTED: ICD-10-PCS | Mod: CPTII,S$GLB,, | Performed by: INTERNAL MEDICINE

## 2022-04-06 PROCEDURE — 3077F PR MOST RECENT SYSTOLIC BLOOD PRESSURE >= 140 MM HG: ICD-10-PCS | Mod: CPTII,S$GLB,, | Performed by: INTERNAL MEDICINE

## 2022-04-06 PROCEDURE — 99204 OFFICE O/P NEW MOD 45 MIN: CPT | Mod: S$GLB,,, | Performed by: INTERNAL MEDICINE

## 2022-04-06 PROCEDURE — 1159F PR MEDICATION LIST DOCUMENTED IN MEDICAL RECORD: ICD-10-PCS | Mod: CPTII,S$GLB,, | Performed by: INTERNAL MEDICINE

## 2022-04-06 PROCEDURE — 99999 PR PBB SHADOW E&M-EST. PATIENT-LVL IV: CPT | Mod: PBBFAC,,, | Performed by: INTERNAL MEDICINE

## 2022-04-06 PROCEDURE — 99999 PR PBB SHADOW E&M-EST. PATIENT-LVL IV: ICD-10-PCS | Mod: PBBFAC,,, | Performed by: INTERNAL MEDICINE

## 2022-04-06 RX ORDER — FUROSEMIDE 20 MG/1
20 TABLET ORAL DAILY
COMMUNITY
Start: 2022-02-21 | End: 2022-04-06

## 2022-04-06 RX ORDER — OMEPRAZOLE 40 MG/1
40 CAPSULE, DELAYED RELEASE ORAL DAILY
COMMUNITY
Start: 2022-04-05

## 2022-04-06 RX ORDER — SPIRONOLACTONE 25 MG/1
12.5 TABLET ORAL 2 TIMES DAILY
Qty: 30 TABLET | Refills: 11 | Status: SHIPPED | OUTPATIENT
Start: 2022-04-06 | End: 2022-04-06 | Stop reason: SDUPTHER

## 2022-04-06 RX ORDER — SERTRALINE HYDROCHLORIDE 100 MG/1
100 TABLET, FILM COATED ORAL DAILY
COMMUNITY
Start: 2022-03-01

## 2022-04-06 RX ORDER — IRBESARTAN 300 MG/1
300 TABLET ORAL DAILY
COMMUNITY
Start: 2022-01-19

## 2022-04-06 RX ORDER — FUROSEMIDE 20 MG/1
20 TABLET ORAL 2 TIMES DAILY
Qty: 60 TABLET | Refills: 11 | Status: SHIPPED | OUTPATIENT
Start: 2022-04-06 | End: 2022-04-06 | Stop reason: SDUPTHER

## 2022-04-06 RX ORDER — SPIRONOLACTONE 25 MG/1
12.5 TABLET ORAL 2 TIMES DAILY
Qty: 30 TABLET | Refills: 11 | Status: SHIPPED | OUTPATIENT
Start: 2022-04-06 | End: 2022-05-12 | Stop reason: SDUPTHER

## 2022-04-06 RX ORDER — FUROSEMIDE 20 MG/1
20 TABLET ORAL 2 TIMES DAILY
Qty: 60 TABLET | Refills: 11 | Status: SHIPPED | OUTPATIENT
Start: 2022-04-06 | End: 2022-05-24

## 2022-04-06 NOTE — PROGRESS NOTES
Subjective:   04/06/2022     Patient ID:  Merle Borden is a 81 y.o. female who presents for evaulation of Shortness of Breath, Leg Swelling, and Hypertension      She comes in today for evaluation of swelling and increase blood pressure.  She has had increasing shortness of breath.  She has not had chest pains tightness.  She has not had PND or orthopnea.    She does not have a history of heart problems.  She does have a history of hypertension.  She has also had a subdural hematoma last year.    She has also being readied for a left knee replacement.    She sees Dr. Peña for asthma, her PCP is Dr. Dorantes.      Past Medical History:   Diagnosis Date    Asthma     OP (osteoporosis)        Review of patient's allergies indicates:   Allergen Reactions    Codeine Nausea And Vomiting and Other (See Comments)     Upset stomach         Current Outpatient Medications:     albuterol (PROVENTIL/VENTOLIN HFA) 90 mcg/actuation inhaler, 2 puffs., Disp: , Rfl:     alendronate (FOSAMAX) 70 MG tablet, Take 70 mg by mouth every 7 days., Disp: , Rfl:     ALPRAZolam (XANAX) 0.5 MG tablet, Take 0.5 mg by mouth Daily., Disp: , Rfl: 5    atorvastatin calcium (LIPITOR ORAL), Take 80 mg by mouth., Disp: , Rfl:     estrogens, conjugated (PREMARIN VAGL), Place 1 g vaginally twice a week., Disp: , Rfl:     folic acid/multivit-min/lutein (CENTRUM SILVER ORAL), Take by mouth., Disp: , Rfl:     irbesartan (AVAPRO) 300 MG tablet, , Disp: , Rfl:     montelukast sodium (SINGULAIR ORAL), Take 10 mg by mouth., Disp: , Rfl:     omeprazole (PRILOSEC) 40 MG capsule, Take 40 mg by mouth once daily., Disp: , Rfl:     sertraline (ZOLOFT) 100 MG tablet, Take 100 mg by mouth once daily., Disp: , Rfl:     SYMBICORT 80-4.5 mcg/actuation HFAA, INHALE 2 PUFFS PO INTO LUNGS BID TO PREVENT ASTHMA, Disp: , Rfl: 5    furosemide (LASIX) 20 MG tablet, Take 1 tablet (20 mg total) by mouth 2 (two) times daily. Decrease to 1 a day 1 swelling  and blood pressure improves, Disp: 60 tablet, Rfl: 11    spironolactone (ALDACTONE) 25 MG tablet, Take 0.5 tablets (12.5 mg total) by mouth 2 (two) times daily. Decrease to 1/2tab a day when swelling and blood pressure improved, Disp: 30 tablet, Rfl: 11     Objective:   Review of Systems   Constitutional: Positive for malaise/fatigue and weight gain.   Cardiovascular: Positive for dyspnea on exertion, irregular heartbeat and leg swelling. Negative for chest pain, claudication, cyanosis, near-syncope, orthopnea, palpitations, paroxysmal nocturnal dyspnea and syncope.   Musculoskeletal: Positive for arthritis and back pain.         Vitals:    04/06/22 1441   BP: (!) 146/73   Pulse: 69     Wt Readings from Last 3 Encounters:   04/06/22 73.5 kg (162 lb 0.6 oz)   08/19/21 71.4 kg (157 lb 6.5 oz)   07/09/20 66 kg (145 lb 8.1 oz)     Temp Readings from Last 3 Encounters:   05/23/16 97.6 °F (36.4 °C) (Oral)   10/06/14 97.1 °F (36.2 °C) (Oral)     BP Readings from Last 3 Encounters:   04/06/22 (!) 146/73   08/19/21 126/76   07/09/20 128/70     Pulse Readings from Last 3 Encounters:   04/06/22 69   05/23/16 70   10/06/14 74             Physical Exam  Vitals reviewed.   Constitutional:       General: She is not in acute distress.     Appearance: She is well-developed.   HENT:      Head: Normocephalic and atraumatic.      Nose: Nose normal.   Eyes:      Conjunctiva/sclera: Conjunctivae normal.      Pupils: Pupils are equal, round, and reactive to light.   Neck:      Vascular: Hepatojugular reflux present. No JVD.   Cardiovascular:      Rate and Rhythm: Normal rate and regular rhythm.      Pulses: Intact distal pulses.      Heart sounds: Normal heart sounds. No murmur heard.    No friction rub. No gallop.   Pulmonary:      Effort: Pulmonary effort is normal. No respiratory distress.      Breath sounds: Normal breath sounds. No wheezing or rales.   Chest:      Chest wall: No tenderness.   Abdominal:      General: Bowel sounds  are normal. There is no distension.      Palpations: Abdomen is soft.      Tenderness: There is no abdominal tenderness.   Musculoskeletal:         General: No tenderness or deformity. Normal range of motion.      Cervical back: Normal range of motion and neck supple.      Right lower leg: Edema (2+) present.      Left lower leg: Edema ( 2+) present.   Skin:     General: Skin is warm and dry.      Findings: No erythema or rash.   Neurological:      Mental Status: She is alert and oriented to person, place, and time.      Cranial Nerves: No cranial nerve deficit.      Motor: No abnormal muscle tone.      Coordination: Coordination normal.   Psychiatric:         Behavior: Behavior normal.         Thought Content: Thought content normal.         Judgment: Judgment normal.           No results found for: CHOL  No results found for: HDL  No results found for: LDLCALC  No results found for: ALT, AST  No results found for: CREATININE, BUN, NA, K, CO2  No results found for: HGB, HCT        See recent lab work in Care Everywhere        EKG shows normal sinus rhythm, normal study.    Assessment and Plan:     Acute diastolic heart failure  -     IN OFFICE EKG 12-LEAD (to [facility])  -     Echo; Future; Expected date: 04/07/2022  -     Basic Metabolic Panel; Future; Expected date: 04/11/2022  -     spironolactone (ALDACTONE) 25 MG tablet; Take 0.5 tablets (12.5 mg total) by mouth 2 (two) times daily. Decrease to 1/2tab a day when swelling and blood pressure improved  Dispense: 30 tablet; Refill: 11  -     furosemide (LASIX) 20 MG tablet; Take 1 tablet (20 mg total) by mouth 2 (two) times daily. Decrease to 1 a day 1 swelling and blood pressure improves  Dispense: 60 tablet; Refill: 11    Primary hypertension  -     spironolactone (ALDACTONE) 25 MG tablet; Take 0.5 tablets (12.5 mg total) by mouth 2 (two) times daily. Decrease to 1/2tab a day when swelling and blood pressure improved  Dispense: 30 tablet; Refill: 11  -     furosemide  (LASIX) 20 MG tablet; Take 1 tablet (20 mg total) by mouth 2 (two) times daily. Decrease to 1 a day 1 swelling and blood pressure improves  Dispense: 60 tablet; Refill: 11    Preoperative clearance  Comments:  Will postpone surgery until cardiac evaluation complete, swelling improved and blood pressure normalized    She will increase furosemide and begin spironolactone in a low dose.  Blood work will be rechecked next week.  Echocardiography will be obtained.  This should help improve her swelling and blood pressure.  I would like her to wear support hose are wrapped for legs with Ace bandages, but she is unable to do this.  She lives alone.    When blood pressure and fluid optimize, she will then undergo a stress test to evaluate for myocardial ischemia.      Follow up in about 2 weeks (around 4/20/2022).

## 2022-04-06 NOTE — PATIENT INSTRUCTIONS
Recommendations:  1. Increase furosemide to 20 mg tablets, 1 tablet twice a day to get rid of fluid, when fluid improved, okay to decrease to once daily again  2. Add spironolactone 25 mg tablets, 1/2 tablet twice a day with each furosemide, when furosemide decreased to 1 a day, also decrease spironolactone to 1/2 tablet daily  3. Please do a blood test early next week  4. Please do an echocardiogram to assess overall heart strength  5. Will re-evaluate you in 2 weeks

## 2022-04-12 ENCOUNTER — HOSPITAL ENCOUNTER (OUTPATIENT)
Dept: CARDIOLOGY | Facility: HOSPITAL | Age: 82
Discharge: HOME | End: 2022-04-12
Attending: INTERNAL MEDICINE
Payer: MEDICARE

## 2022-04-12 VITALS
SYSTOLIC BLOOD PRESSURE: 124 MMHG | HEIGHT: 61 IN | BODY MASS INDEX: 30.58 KG/M2 | HEART RATE: 66 BPM | WEIGHT: 162 LBS | DIASTOLIC BLOOD PRESSURE: 64 MMHG

## 2022-04-12 DIAGNOSIS — I50.31 ACUTE DIASTOLIC HEART FAILURE: ICD-10-CM

## 2022-04-12 LAB
ASCENDING AORTA: 3.54 CM
AV INDEX (PROSTH): 0.93
AV MEAN GRADIENT: 4 MMHG
AV PEAK GRADIENT: 6 MMHG
AV VALVE AREA: 3.47 CM2
AV VELOCITY RATIO: 0.94
BSA FOR ECHO PROCEDURE: 1.78 M2
CV ECHO LV RWT: 0.37 CM
DOP CALC AO PEAK VEL: 1.23 M/S
DOP CALC AO VTI: 30.44 CM
DOP CALC LVOT AREA: 3.7 CM2
DOP CALC LVOT DIAMETER: 2.18 CM
DOP CALC LVOT PEAK VEL: 1.16 M/S
DOP CALC LVOT STROKE VOLUME: 105.61 CM3
DOP CALCLVOT PEAK VEL VTI: 28.31 CM
E WAVE DECELERATION TIME: 177.95 MSEC
E/A RATIO: 0.78
E/E' RATIO: 11.83 M/S
ECHO LV POSTERIOR WALL: 0.87 CM (ref 0.6–1.1)
EJECTION FRACTION: 65 %
FRACTIONAL SHORTENING: 36 % (ref 28–44)
INTERVENTRICULAR SEPTUM: 0.85 CM (ref 0.6–1.1)
IVRT: 122.74 MSEC
LA MAJOR: 4.7 CM
LA MINOR: 4.64 CM
LA WIDTH: 3.38 CM
LEFT ATRIUM SIZE: 4.12 CM
LEFT ATRIUM VOLUME INDEX MOD: 21.5 ML/M2
LEFT ATRIUM VOLUME INDEX: 32 ML/M2
LEFT ATRIUM VOLUME MOD: 37.11 CM3
LEFT ATRIUM VOLUME: 55.28 CM3
LEFT INTERNAL DIMENSION IN SYSTOLE: 2.96 CM (ref 2.1–4)
LEFT VENTRICLE DIASTOLIC VOLUME INDEX: 57.91 ML/M2
LEFT VENTRICLE DIASTOLIC VOLUME: 100.19 ML
LEFT VENTRICLE MASS INDEX: 77 G/M2
LEFT VENTRICLE SYSTOLIC VOLUME INDEX: 19.6 ML/M2
LEFT VENTRICLE SYSTOLIC VOLUME: 33.9 ML
LEFT VENTRICULAR INTERNAL DIMENSION IN DIASTOLE: 4.66 CM (ref 3.5–6)
LEFT VENTRICULAR MASS: 132.47 G
LV LATERAL E/E' RATIO: 14.2 M/S
LV SEPTAL E/E' RATIO: 10.14 M/S
MV PEAK A VEL: 0.91 M/S
MV PEAK E VEL: 0.71 M/S
MV STENOSIS PRESSURE HALF TIME: 51.61 MS
MV VALVE AREA P 1/2 METHOD: 4.26 CM2
PISA TR MAX VEL: 2.5 M/S
PULM VEIN S/D RATIO: 1.15
PV PEAK D VEL: 0.48 M/S
PV PEAK S VEL: 0.55 M/S
RA MAJOR: 4.54 CM
RA PRESSURE: 3 MMHG
RA WIDTH: 3.17 CM
RIGHT VENTRICULAR END-DIASTOLIC DIMENSION: 2.96 CM
SINUS: 3.31 CM
STJ: 2.93 CM
TDI LATERAL: 0.05 M/S
TDI SEPTAL: 0.07 M/S
TDI: 0.06 M/S
TR MAX PG: 25 MMHG
TRICUSPID ANNULAR PLANE SYSTOLIC EXCURSION: 1.99 CM
TV REST PULMONARY ARTERY PRESSURE: 28 MMHG

## 2022-04-12 PROCEDURE — 93306 TTE W/DOPPLER COMPLETE: CPT

## 2022-04-12 PROCEDURE — 93306 ECHO (CUPID ONLY): ICD-10-PCS | Mod: 26,,, | Performed by: INTERNAL MEDICINE

## 2022-04-12 PROCEDURE — 93306 TTE W/DOPPLER COMPLETE: CPT | Mod: 26,,, | Performed by: INTERNAL MEDICINE

## 2022-04-20 ENCOUNTER — OFFICE VISIT (OUTPATIENT)
Dept: CARDIOLOGY | Facility: CLINIC | Age: 82
End: 2022-04-20
Payer: MEDICARE

## 2022-04-20 VITALS
SYSTOLIC BLOOD PRESSURE: 134 MMHG | HEART RATE: 83 BPM | HEIGHT: 61 IN | BODY MASS INDEX: 30.47 KG/M2 | DIASTOLIC BLOOD PRESSURE: 62 MMHG | WEIGHT: 161.38 LBS

## 2022-04-20 DIAGNOSIS — I50.31 ACUTE DIASTOLIC HEART FAILURE: Primary | ICD-10-CM

## 2022-04-20 DIAGNOSIS — I20.89 ANGINAL EQUIVALENT: ICD-10-CM

## 2022-04-20 DIAGNOSIS — I10 PRIMARY HYPERTENSION: ICD-10-CM

## 2022-04-20 DIAGNOSIS — E78.00 HYPERCHOLESTEROLEMIA: ICD-10-CM

## 2022-04-20 DIAGNOSIS — Z01.818 PREOPERATIVE CLEARANCE: ICD-10-CM

## 2022-04-20 PROCEDURE — 3075F PR MOST RECENT SYSTOLIC BLOOD PRESS GE 130-139MM HG: ICD-10-PCS | Mod: CPTII,S$GLB,, | Performed by: INTERNAL MEDICINE

## 2022-04-20 PROCEDURE — 99999 PR PBB SHADOW E&M-EST. PATIENT-LVL IV: ICD-10-PCS | Mod: PBBFAC,,, | Performed by: INTERNAL MEDICINE

## 2022-04-20 PROCEDURE — 1101F PR PT FALLS ASSESS DOC 0-1 FALLS W/OUT INJ PAST YR: ICD-10-PCS | Mod: CPTII,S$GLB,, | Performed by: INTERNAL MEDICINE

## 2022-04-20 PROCEDURE — 1160F RVW MEDS BY RX/DR IN RCRD: CPT | Mod: CPTII,S$GLB,, | Performed by: INTERNAL MEDICINE

## 2022-04-20 PROCEDURE — 1159F PR MEDICATION LIST DOCUMENTED IN MEDICAL RECORD: ICD-10-PCS | Mod: CPTII,S$GLB,, | Performed by: INTERNAL MEDICINE

## 2022-04-20 PROCEDURE — 1159F MED LIST DOCD IN RCRD: CPT | Mod: CPTII,S$GLB,, | Performed by: INTERNAL MEDICINE

## 2022-04-20 PROCEDURE — 99214 OFFICE O/P EST MOD 30 MIN: CPT | Mod: S$GLB,,, | Performed by: INTERNAL MEDICINE

## 2022-04-20 PROCEDURE — 99999 PR PBB SHADOW E&M-EST. PATIENT-LVL IV: CPT | Mod: PBBFAC,,, | Performed by: INTERNAL MEDICINE

## 2022-04-20 PROCEDURE — 3288F PR FALLS RISK ASSESSMENT DOCUMENTED: ICD-10-PCS | Mod: CPTII,S$GLB,, | Performed by: INTERNAL MEDICINE

## 2022-04-20 PROCEDURE — 3075F SYST BP GE 130 - 139MM HG: CPT | Mod: CPTII,S$GLB,, | Performed by: INTERNAL MEDICINE

## 2022-04-20 PROCEDURE — 3078F PR MOST RECENT DIASTOLIC BLOOD PRESSURE < 80 MM HG: ICD-10-PCS | Mod: CPTII,S$GLB,, | Performed by: INTERNAL MEDICINE

## 2022-04-20 PROCEDURE — 99214 PR OFFICE/OUTPT VISIT, EST, LEVL IV, 30-39 MIN: ICD-10-PCS | Mod: S$GLB,,, | Performed by: INTERNAL MEDICINE

## 2022-04-20 PROCEDURE — 3288F FALL RISK ASSESSMENT DOCD: CPT | Mod: CPTII,S$GLB,, | Performed by: INTERNAL MEDICINE

## 2022-04-20 PROCEDURE — 1160F PR REVIEW ALL MEDS BY PRESCRIBER/CLIN PHARMACIST DOCUMENTED: ICD-10-PCS | Mod: CPTII,S$GLB,, | Performed by: INTERNAL MEDICINE

## 2022-04-20 PROCEDURE — 1126F PR PAIN SEVERITY QUANTIFIED, NO PAIN PRESENT: ICD-10-PCS | Mod: CPTII,S$GLB,, | Performed by: INTERNAL MEDICINE

## 2022-04-20 PROCEDURE — 1126F AMNT PAIN NOTED NONE PRSNT: CPT | Mod: CPTII,S$GLB,, | Performed by: INTERNAL MEDICINE

## 2022-04-20 PROCEDURE — 1101F PT FALLS ASSESS-DOCD LE1/YR: CPT | Mod: CPTII,S$GLB,, | Performed by: INTERNAL MEDICINE

## 2022-04-20 PROCEDURE — 3078F DIAST BP <80 MM HG: CPT | Mod: CPTII,S$GLB,, | Performed by: INTERNAL MEDICINE

## 2022-04-20 RX ORDER — METOPROLOL SUCCINATE 25 MG/1
25 TABLET, EXTENDED RELEASE ORAL DAILY
COMMUNITY
Start: 2022-03-30

## 2022-04-20 NOTE — PROGRESS NOTES
Subjective:   04/20/2022     Patient ID:  Merle Borden is a 81 y.o. female who presents for evaulation of Congestive Heart Failure, Hypertension, and Hyperlipidemia      She comes in today for follow-up.  I had seen her 2 weeks ago with complaints of swelling and increased blood pressure.  She had had increasing shortness of breath.  She had not had chest pains tightness.  She had not had PND or orthopnea.    She does not have a history of heart problems.  She does have a history of hypertension.  She has also had a subdural hematoma last year.    She has also being readied for a left knee replacement.    She sees Dr. Peña for asthma, her PCP is Dr. Dorantes.    Recommendations made 2 weeks ago:  1. Increase furosemide to 20 mg tablets, 1 tablet twice a day to get rid of fluid, when fluid improved, okay to decrease to once daily again  2. Add spironolactone 25 mg tablets, 1/2 tablet twice a day with each furosemide, when furosemide decreased to 1 a day, also decrease spironolactone to 1/2 tablet daily  3. Please do a blood test early next week  4. Please do an echocardiogram to assess overall heart strength  5. Will re-evaluate you in 2 weeks    Subsequent echocardiogram:  The left ventricle is normal in size with normal systolic function.  The estimated ejection fraction is 65%.  Normal left ventricular diastolic function.  The estimated PA systolic pressure is 28 mmHg.  Normal right ventricular size with normal right ventricular systolic function.  Normal central venous pressure (3 mmHg).  There is no pulmonary hypertension.    Subsequent lab:   CREATININE 0.8 04/12/2022   BUN              17 04/12/2022   NA             140 04/12/2022   K              4.6 04/12/2022   CO2             30 (H) 04/12/2022    Impression from 2 weeks ago:  She will increase furosemide and begin spironolactone in a low dose.  Blood work will be rechecked next week.  Echocardiography will be obtained.  This should help improve  her swelling and blood pressure.  I would like her to wear support hose are wrapped for legs with Ace bandages, but she is unable to do this.  She lives alone.    When blood pressure and fluid optimize, she will then undergo a stress test to evaluate for myocardial ischemia.      Today, she is here for review.  Despite alteration of her diuretic regimen, she is really not lost any weight, only 1 lb.  She continues to have shortness of breath.  She did eat ham over the weekend though and felt that this seemed to set her back.  She has not had chest pains or tightness.      Past Medical History:   Diagnosis Date    Asthma     OP (osteoporosis)        Review of patient's allergies indicates:   Allergen Reactions    Codeine Nausea And Vomiting and Other (See Comments)     Upset stomach         Current Outpatient Medications:     alendronate (FOSAMAX) 70 MG tablet, Take 70 mg by mouth every 7 days., Disp: , Rfl:     ALPRAZolam (XANAX) 0.5 MG tablet, Take 0.5 mg by mouth Daily., Disp: , Rfl: 5    atorvastatin calcium (LIPITOR ORAL), Take 80 mg by mouth., Disp: , Rfl:     folic acid/multivit-min/lutein (CENTRUM SILVER ORAL), Take by mouth., Disp: , Rfl:     furosemide (LASIX) 20 MG tablet, Take 1 tablet (20 mg total) by mouth 2 (two) times daily. Decrease to 1 a day 1 swelling and blood pressure improves, Disp: 60 tablet, Rfl: 11    irbesartan (AVAPRO) 300 MG tablet, , Disp: , Rfl:     metoprolol succinate (TOPROL-XL) 25 MG 24 hr tablet, Take 25 mg by mouth once daily., Disp: , Rfl:     montelukast sodium (SINGULAIR ORAL), Take 10 mg by mouth., Disp: , Rfl:     omeprazole (PRILOSEC) 40 MG capsule, Take 40 mg by mouth once daily., Disp: , Rfl:     sertraline (ZOLOFT) 100 MG tablet, Take 100 mg by mouth once daily., Disp: , Rfl:     spironolactone (ALDACTONE) 25 MG tablet, Take 0.5 tablets (12.5 mg total) by mouth 2 (two) times daily. Decrease to 1/2tab a day when swelling and blood pressure improved, Disp: 30  tablet, Rfl: 11    SYMBICORT 80-4.5 mcg/actuation HFAA, INHALE 2 PUFFS PO INTO LUNGS BID TO PREVENT ASTHMA, Disp: , Rfl: 5     Objective:   Review of Systems   Constitutional: Positive for malaise/fatigue and weight gain.   Cardiovascular: Positive for dyspnea on exertion, irregular heartbeat and leg swelling. Negative for chest pain, claudication, cyanosis, near-syncope, orthopnea, palpitations, paroxysmal nocturnal dyspnea and syncope.   Musculoskeletal: Positive for arthritis and back pain.         Vitals:    04/20/22 1535   BP: 134/62   Pulse: 83     Wt Readings from Last 3 Encounters:   04/20/22 73.2 kg (161 lb 6 oz)   04/12/22 73.5 kg (162 lb)   04/06/22 73.5 kg (162 lb 0.6 oz)     Temp Readings from Last 3 Encounters:   05/23/16 97.6 °F (36.4 °C) (Oral)   10/06/14 97.1 °F (36.2 °C) (Oral)     BP Readings from Last 3 Encounters:   04/20/22 134/62   04/12/22 124/64   04/06/22 (!) 146/73     Pulse Readings from Last 3 Encounters:   04/20/22 83   04/12/22 66   04/06/22 69             Physical Exam  Vitals reviewed.   Constitutional:       General: She is not in acute distress.     Appearance: She is well-developed.   HENT:      Head: Normocephalic and atraumatic.      Nose: Nose normal.   Eyes:      Conjunctiva/sclera: Conjunctivae normal.      Pupils: Pupils are equal, round, and reactive to light.   Neck:      Vascular: Hepatojugular reflux present. No JVD.   Cardiovascular:      Rate and Rhythm: Normal rate and regular rhythm.      Pulses: Intact distal pulses.      Heart sounds: Normal heart sounds. No murmur heard.    No friction rub. No gallop.   Pulmonary:      Effort: Pulmonary effort is normal. No respiratory distress.      Breath sounds: Normal breath sounds. No wheezing or rales.   Chest:      Chest wall: No tenderness.   Abdominal:      General: Bowel sounds are normal. There is no distension.      Palpations: Abdomen is soft.      Tenderness: There is no abdominal tenderness.   Musculoskeletal:          General: No tenderness or deformity. Normal range of motion.      Cervical back: Normal range of motion and neck supple.      Right lower leg: Edema (2+) present.      Left lower leg: Edema ( 2+) present.   Skin:     General: Skin is warm and dry.      Findings: No erythema or rash.   Neurological:      Mental Status: She is alert and oriented to person, place, and time.      Cranial Nerves: No cranial nerve deficit.      Motor: No abnormal muscle tone.      Coordination: Coordination normal.   Psychiatric:         Behavior: Behavior normal.         Thought Content: Thought content normal.         Judgment: Judgment normal.           No results found for: CHOL  No results found for: HDL  No results found for: LDLCALC  No results found for: ALT, AST  Lab Results   Component Value Date    CREATININE 0.8 04/12/2022    BUN 17 04/12/2022     04/12/2022    K 4.6 04/12/2022    CO2 30 (H) 04/12/2022     No results found for: HGB, HCT        See recent lab work in Care Everywhere        EKG shows normal sinus rhythm, normal study.    Assessment and Plan:     Acute diastolic heart failure  Comments:  Continues to have lower extremity swelling and shortness of breath    Anginal equivalent  Comments:  Continues dyspnea on exertion, nuclear stress test to assess for myocardial ischemia prior to surgery    Hypercholesterolemia    Primary hypertension  Comments:  Appears improved    Preoperative clearance          Recommendations made to patient:  1. Change furosemide to 20 mg tablets, 2 tablets twice a day and spironolactone to 25 mg twice a day.  2. If swelling improves, decrease furosemide to 40 mg, two 20 mg tablets, every morning all only.  When you decrease the furosemide, also decrease spironolactone to 25 mg 1 tablet every morning.  3. Wrapped both lower extremities with 6 in Ace bandages from the foot to the knee any time she is out of bed.  4. I will schedule a nuclear stress test.  5. Message me next week  with an update.        No follow-ups on file.

## 2022-04-20 NOTE — PATIENT INSTRUCTIONS
Recommendations:  1. Change furosemide to 20 mg tablets, 2 tablets twice a day and spironolactone to 25 mg twice a day.  2. If swelling improves, decrease furosemide to 40 mg, two 20 mg tablets, every morning all only.  When you decrease the furosemide, also decrease spironolactone to 25 mg 1 tablet every morning.  3. Wrapped both lower extremities with 6 in Ace bandages from the foot to the knee any time she is out of bed.  4. I will schedule a nuclear stress test.  5. Message me next week with an update.

## 2022-04-28 ENCOUNTER — TELEPHONE (OUTPATIENT)
Dept: CARDIOLOGY | Facility: HOSPITAL | Age: 82
End: 2022-04-28
Payer: MEDICARE

## 2022-04-28 ENCOUNTER — PATIENT MESSAGE (OUTPATIENT)
Dept: CARDIOLOGY | Facility: CLINIC | Age: 82
End: 2022-04-28
Payer: MEDICARE

## 2022-05-02 ENCOUNTER — HOSPITAL ENCOUNTER (OUTPATIENT)
Dept: CARDIOLOGY | Facility: HOSPITAL | Age: 82
Discharge: HOME | End: 2022-05-02
Attending: INTERNAL MEDICINE
Payer: MEDICARE

## 2022-05-02 VITALS
SYSTOLIC BLOOD PRESSURE: 162 MMHG | HEART RATE: 78 BPM | BODY MASS INDEX: 30.4 KG/M2 | HEIGHT: 61 IN | DIASTOLIC BLOOD PRESSURE: 81 MMHG | RESPIRATION RATE: 18 BRPM | WEIGHT: 161 LBS

## 2022-05-02 DIAGNOSIS — I20.89 ANGINAL EQUIVALENT: ICD-10-CM

## 2022-05-02 LAB
CV STRESS BASE HR: 64 BPM
DIASTOLIC BLOOD PRESSURE: 81 MMHG
EJECTION FRACTION- HIGH: 65 %
END DIASTOLIC INDEX-HIGH: 153 ML/M2
END DIASTOLIC INDEX-LOW: 93 ML/M2
END SYSTOLIC INDEX-HIGH: 71 ML/M2
END SYSTOLIC INDEX-LOW: 31 ML/M2
OHS CV CPX 1 MINUTE RECOVERY HEART RATE: 95 BPM
OHS CV CPX 85 PERCENT MAX PREDICTED HEART RATE MALE: 116
OHS CV CPX MAX PREDICTED HEART RATE: 136
OHS CV CPX PATIENT IS FEMALE: 1
OHS CV CPX PATIENT IS MALE: 0
OHS CV CPX PEAK DIASTOLIC BLOOD PRESSURE: 61 MMHG
OHS CV CPX PEAK HEAR RATE: 76 BPM
OHS CV CPX PEAK RATE PRESSURE PRODUCT: NORMAL
OHS CV CPX PEAK SYSTOLIC BLOOD PRESSURE: 150 MMHG
OHS CV CPX PERCENT MAX PREDICTED HEART RATE ACHIEVED: 56
OHS CV CPX RATE PRESSURE PRODUCT PRESENTING: 9344
RETIRED EF AND QEF - SEE NOTES: 53 %
SYSTOLIC BLOOD PRESSURE: 146 MMHG

## 2022-05-02 PROCEDURE — 63600175 PHARM REV CODE 636 W HCPCS: Performed by: INTERNAL MEDICINE

## 2022-05-02 PROCEDURE — 93016 CV STRESS TEST SUPVJ ONLY: CPT | Mod: ,,, | Performed by: INTERNAL MEDICINE

## 2022-05-02 PROCEDURE — 93018 CV STRESS TEST I&R ONLY: CPT | Mod: ,,, | Performed by: INTERNAL MEDICINE

## 2022-05-02 PROCEDURE — 78452 HT MUSCLE IMAGE SPECT MULT: CPT | Mod: 26,,, | Performed by: INTERNAL MEDICINE

## 2022-05-02 PROCEDURE — 93018 STRESS TEST WITH MYOCARDIAL PERFUSION (CUPID ONLY): ICD-10-PCS | Mod: ,,, | Performed by: INTERNAL MEDICINE

## 2022-05-02 PROCEDURE — 93016 STRESS TEST WITH MYOCARDIAL PERFUSION (CUPID ONLY): ICD-10-PCS | Mod: ,,, | Performed by: INTERNAL MEDICINE

## 2022-05-02 PROCEDURE — A9502 TC99M TETROFOSMIN: HCPCS

## 2022-05-02 PROCEDURE — 78452 STRESS TEST WITH MYOCARDIAL PERFUSION (CUPID ONLY): ICD-10-PCS | Mod: 26,,, | Performed by: INTERNAL MEDICINE

## 2022-05-02 RX ORDER — AMINOPHYLLINE 25 MG/ML
75 INJECTION, SOLUTION INTRAVENOUS ONCE
Status: COMPLETED | OUTPATIENT
Start: 2022-05-02 | End: 2022-05-02

## 2022-05-02 RX ORDER — REGADENOSON 0.08 MG/ML
0.4 INJECTION, SOLUTION INTRAVENOUS ONCE
Status: COMPLETED | OUTPATIENT
Start: 2022-05-02 | End: 2022-05-02

## 2022-05-02 RX ADMIN — AMINOPHYLLINE 75 MG: 25 INJECTION, SOLUTION INTRAVENOUS at 09:05

## 2022-05-02 RX ADMIN — REGADENOSON 0.4 MG: 0.08 INJECTION, SOLUTION INTRAVENOUS at 09:05

## 2022-05-05 ENCOUNTER — TELEPHONE (OUTPATIENT)
Dept: CARDIOLOGY | Facility: CLINIC | Age: 82
End: 2022-05-05
Payer: MEDICARE

## 2022-05-05 DIAGNOSIS — R60.9 EDEMA, UNSPECIFIED TYPE: ICD-10-CM

## 2022-05-05 DIAGNOSIS — I50.31 ACUTE DIASTOLIC HEART FAILURE: Primary | ICD-10-CM

## 2022-05-05 NOTE — TELEPHONE ENCOUNTER
----- Message from Beth Mckenna sent at 5/5/2022 11:33 AM CDT -----  Pt was asked to call Dr. Turcios after  having her stress test and can be reached at [phone]    Thank you

## 2022-05-05 NOTE — TELEPHONE ENCOUNTER
She continues to have increased lower extremity swelling despite increase furosemide in the addition of spironolactone.  Will check lower extremities for DVT.  Repeat laboratory, office visit in 1-2 weeks.

## 2022-05-05 NOTE — TELEPHONE ENCOUNTER
----- Message from Octavia Jackson MA sent at 5/5/2022  4:41 PM CDT -----  Contact: self  Pt is returning your call. Please call [phone]     Noted.  Meds refilled for 30 days

## 2022-05-09 ENCOUNTER — HOSPITAL ENCOUNTER (OUTPATIENT)
Dept: CARDIOLOGY | Facility: HOSPITAL | Age: 82
Discharge: HOME | End: 2022-05-09
Attending: INTERNAL MEDICINE
Payer: MEDICARE

## 2022-05-09 DIAGNOSIS — I50.31 ACUTE DIASTOLIC HEART FAILURE: ICD-10-CM

## 2022-05-09 DIAGNOSIS — R60.9 EDEMA, UNSPECIFIED TYPE: ICD-10-CM

## 2022-05-09 LAB
LEFT GREAT SAPHENOUS DISTAL THIGH DIA: 0.27 CM
LEFT GREAT SAPHENOUS JUNCTION DIA: 0.42 CM
LEFT GREAT SAPHENOUS KNEE DIA: 0.25 CM
LEFT GREAT SAPHENOUS KNEE REFLUX: 4001 MS
LEFT GREAT SAPHENOUS MIDDLE THIGH DIA: 0.27 CM
LEFT GREAT SAPHENOUS PROXIMAL CALF DIA: 0.18 CM
LEFT GREAT SAPHENOUS PROXIMAL CALF REFLUX: 1632 MS
LEFT SMALL SAPHENOUS KNEE DIA: 0.26 CM
LEFT SMALL SAPHENOUS KNEE REFLUX: 2003 MS
LEFT SMALL SAPHENOUS SPJ DIA: 0.33 CM
LEFT SMALL SAPHENOUS SPJ REFLUX: 685 MS
RIGHT GIAC DIA: 0.17 MM
RIGHT GIAC REF: 1684 MS
RIGHT GREAT SAPHENOUS DISTAL THIGH DIA: 0.32 CM
RIGHT GREAT SAPHENOUS JUNCTION DIA: 0.45 CM
RIGHT GREAT SAPHENOUS KNEE DIA: 0.25 CM
RIGHT GREAT SAPHENOUS MIDDLE THIGH DIA: 0.29 CM
RIGHT GREAT SAPHENOUS PROXIMAL CALF DIA: 0.28 CM
RIGHT SMALL SAPHENOUS KNEE DIA: 0.2 CM
RIGHT SMALL SAPHENOUS KNEE REFLUX: 1284 MS
RIGHT SMALL SAPHENOUS SPJ DIA: 0.23 CM
RIGHT SMALL SAPHENOUS SPJ REFLUX: 1056 MS

## 2022-05-09 PROCEDURE — 93970 EXTREMITY STUDY: CPT | Mod: 26,,, | Performed by: INTERNAL MEDICINE

## 2022-05-09 PROCEDURE — 93970 EXTREMITY STUDY: CPT | Mod: TC

## 2022-05-09 PROCEDURE — 93970 CV US LOWER VENOUS INSUFFICIENCY BILATERAL (CUPID ONLY): ICD-10-PCS | Mod: 26,,, | Performed by: INTERNAL MEDICINE

## 2022-05-10 ENCOUNTER — TELEPHONE (OUTPATIENT)
Dept: CARDIOLOGY | Facility: CLINIC | Age: 82
End: 2022-05-10
Payer: MEDICARE

## 2022-05-10 NOTE — TELEPHONE ENCOUNTER
Swelling seems better on furosemide 2 tablets twice a day.  Lower extremity venous ultrasound shows no DVT, but venous insufficiency is present.    She will obtain her scheduled lab work.  I would plan to see her back in 2 weeks to see if she can be cleared for surgery at that time.

## 2022-05-11 ENCOUNTER — LAB VISIT (OUTPATIENT)
Dept: LAB | Facility: HOSPITAL | Age: 82
End: 2022-05-11
Attending: INTERNAL MEDICINE
Payer: MEDICARE

## 2022-05-11 DIAGNOSIS — R60.9 EDEMA, UNSPECIFIED TYPE: ICD-10-CM

## 2022-05-11 DIAGNOSIS — I50.31 ACUTE DIASTOLIC HEART FAILURE: ICD-10-CM

## 2022-05-11 LAB
ANION GAP SERPL CALC-SCNC: 11 MMOL/L (ref 8–16)
BUN SERPL-MCNC: 23 MG/DL (ref 8–23)
CALCIUM SERPL-MCNC: 9.7 MG/DL (ref 8.7–10.5)
CHLORIDE SERPL-SCNC: 97 MMOL/L (ref 95–110)
CO2 SERPL-SCNC: 26 MMOL/L (ref 23–29)
CREAT SERPL-MCNC: 0.8 MG/DL (ref 0.5–1.4)
EST. GFR  (AFRICAN AMERICAN): >60 ML/MIN/1.73 M^2
EST. GFR  (NON AFRICAN AMERICAN): >60 ML/MIN/1.73 M^2
GLUCOSE SERPL-MCNC: 105 MG/DL (ref 70–110)
POTASSIUM SERPL-SCNC: 4.3 MMOL/L (ref 3.5–5.1)
SODIUM SERPL-SCNC: 134 MMOL/L (ref 136–145)

## 2022-05-11 PROCEDURE — 80048 BASIC METABOLIC PNL TOTAL CA: CPT | Performed by: INTERNAL MEDICINE

## 2022-05-11 PROCEDURE — 83880 ASSAY OF NATRIURETIC PEPTIDE: CPT | Performed by: INTERNAL MEDICINE

## 2022-05-12 DIAGNOSIS — I50.31 ACUTE DIASTOLIC HEART FAILURE: ICD-10-CM

## 2022-05-12 DIAGNOSIS — I10 PRIMARY HYPERTENSION: ICD-10-CM

## 2022-05-12 RX ORDER — SPIRONOLACTONE 25 MG/1
12.5 TABLET ORAL 2 TIMES DAILY
Qty: 30 TABLET | Refills: 11 | Status: SHIPPED | OUTPATIENT
Start: 2022-05-12 | End: 2022-05-13 | Stop reason: SDUPTHER

## 2022-05-12 NOTE — TELEPHONE ENCOUNTER
----- Message from Beth Mckenna sent at 5/12/2022 10:56 AM CDT -----  Regarding: refill  Requesting an RX refill or new RX.  Is this a refill or new RX: refill  RX name and strength  spironolactone (ALDACTONE) 25 MG tablet  Is this a 30 day or 90 day RX:   Pharmacy name and phone #   [id number] - [place] - [address] AT [facility] [address]   Phone:  [phone]  Fax:  [phone]        The doctors have asked that we provide their patients with the following 2 reminders -- prescription refills can take up to 72 hours, and a friendly reminder that in the future you can use your [facility] account to request refills:

## 2022-05-13 ENCOUNTER — TELEPHONE (OUTPATIENT)
Dept: CARDIOLOGY | Facility: CLINIC | Age: 82
End: 2022-05-13
Payer: MEDICARE

## 2022-05-13 DIAGNOSIS — I10 PRIMARY HYPERTENSION: ICD-10-CM

## 2022-05-13 DIAGNOSIS — I50.31 ACUTE DIASTOLIC HEART FAILURE: ICD-10-CM

## 2022-05-13 LAB — NT-PROBNP SERPL-MCNC: 37 PG/ML

## 2022-05-13 RX ORDER — SPIRONOLACTONE 25 MG/1
12.5 TABLET ORAL 2 TIMES DAILY
Qty: 30 TABLET | Refills: 11 | Status: SHIPPED | OUTPATIENT
Start: 2022-05-13 | End: 2022-05-24

## 2022-05-13 NOTE — TELEPHONE ENCOUNTER
Spoke with [facility] pharmacy; [place] rx is 25 mg 1/2 BID; for 2 weeks rx was increased to 25 mg BID due to edema, but no new rx was sent; pt is now short on pills but has to pay for a 10 day supply according to the pharmacist; this has been explained to pt and she understands

## 2022-05-24 ENCOUNTER — OFFICE VISIT (OUTPATIENT)
Dept: CARDIOLOGY | Facility: CLINIC | Age: 82
End: 2022-05-24
Payer: MEDICARE

## 2022-05-24 VITALS
DIASTOLIC BLOOD PRESSURE: 52 MMHG | SYSTOLIC BLOOD PRESSURE: 111 MMHG | HEIGHT: 61 IN | WEIGHT: 158.75 LBS | BODY MASS INDEX: 29.97 KG/M2 | HEART RATE: 83 BPM

## 2022-05-24 DIAGNOSIS — I10 PRIMARY HYPERTENSION: ICD-10-CM

## 2022-05-24 DIAGNOSIS — I50.32 CHRONIC DIASTOLIC HEART FAILURE: ICD-10-CM

## 2022-05-24 DIAGNOSIS — E78.00 PURE HYPERCHOLESTEROLEMIA: ICD-10-CM

## 2022-05-24 DIAGNOSIS — I87.2 EDEMA OF BOTH LOWER EXTREMITIES DUE TO PERIPHERAL VENOUS INSUFFICIENCY: ICD-10-CM

## 2022-05-24 DIAGNOSIS — Z01.818 PREOPERATIVE CLEARANCE: Primary | ICD-10-CM

## 2022-05-24 PROBLEM — I20.89 ANGINAL EQUIVALENT: Status: RESOLVED | Noted: 2022-04-20 | Resolved: 2022-05-24

## 2022-05-24 PROCEDURE — 99214 OFFICE O/P EST MOD 30 MIN: CPT | Mod: S$GLB,,, | Performed by: INTERNAL MEDICINE

## 2022-05-24 PROCEDURE — 1159F PR MEDICATION LIST DOCUMENTED IN MEDICAL RECORD: ICD-10-PCS | Mod: CPTII,S$GLB,, | Performed by: INTERNAL MEDICINE

## 2022-05-24 PROCEDURE — 99214 PR OFFICE/OUTPT VISIT, EST, LEVL IV, 30-39 MIN: ICD-10-PCS | Mod: S$GLB,,, | Performed by: INTERNAL MEDICINE

## 2022-05-24 PROCEDURE — 3074F PR MOST RECENT SYSTOLIC BLOOD PRESSURE < 130 MM HG: ICD-10-PCS | Mod: CPTII,S$GLB,, | Performed by: INTERNAL MEDICINE

## 2022-05-24 PROCEDURE — 1126F PR PAIN SEVERITY QUANTIFIED, NO PAIN PRESENT: ICD-10-PCS | Mod: CPTII,S$GLB,, | Performed by: INTERNAL MEDICINE

## 2022-05-24 PROCEDURE — 99999 PR PBB SHADOW E&M-EST. PATIENT-LVL III: ICD-10-PCS | Mod: PBBFAC,,, | Performed by: INTERNAL MEDICINE

## 2022-05-24 PROCEDURE — 3078F DIAST BP <80 MM HG: CPT | Mod: CPTII,S$GLB,, | Performed by: INTERNAL MEDICINE

## 2022-05-24 PROCEDURE — 99999 PR PBB SHADOW E&M-EST. PATIENT-LVL III: CPT | Mod: PBBFAC,,, | Performed by: INTERNAL MEDICINE

## 2022-05-24 PROCEDURE — 3288F PR FALLS RISK ASSESSMENT DOCUMENTED: ICD-10-PCS | Mod: CPTII,S$GLB,, | Performed by: INTERNAL MEDICINE

## 2022-05-24 PROCEDURE — 1126F AMNT PAIN NOTED NONE PRSNT: CPT | Mod: CPTII,S$GLB,, | Performed by: INTERNAL MEDICINE

## 2022-05-24 PROCEDURE — 1101F PR PT FALLS ASSESS DOC 0-1 FALLS W/OUT INJ PAST YR: ICD-10-PCS | Mod: CPTII,S$GLB,, | Performed by: INTERNAL MEDICINE

## 2022-05-24 PROCEDURE — 1159F MED LIST DOCD IN RCRD: CPT | Mod: CPTII,S$GLB,, | Performed by: INTERNAL MEDICINE

## 2022-05-24 PROCEDURE — 1160F PR REVIEW ALL MEDS BY PRESCRIBER/CLIN PHARMACIST DOCUMENTED: ICD-10-PCS | Mod: CPTII,S$GLB,, | Performed by: INTERNAL MEDICINE

## 2022-05-24 PROCEDURE — 1101F PT FALLS ASSESS-DOCD LE1/YR: CPT | Mod: CPTII,S$GLB,, | Performed by: INTERNAL MEDICINE

## 2022-05-24 PROCEDURE — 3074F SYST BP LT 130 MM HG: CPT | Mod: CPTII,S$GLB,, | Performed by: INTERNAL MEDICINE

## 2022-05-24 PROCEDURE — 3288F FALL RISK ASSESSMENT DOCD: CPT | Mod: CPTII,S$GLB,, | Performed by: INTERNAL MEDICINE

## 2022-05-24 PROCEDURE — 1160F RVW MEDS BY RX/DR IN RCRD: CPT | Mod: CPTII,S$GLB,, | Performed by: INTERNAL MEDICINE

## 2022-05-24 PROCEDURE — 3078F PR MOST RECENT DIASTOLIC BLOOD PRESSURE < 80 MM HG: ICD-10-PCS | Mod: CPTII,S$GLB,, | Performed by: INTERNAL MEDICINE

## 2022-05-24 RX ORDER — SPIRONOLACTONE 25 MG/1
25 TABLET ORAL 2 TIMES DAILY
Qty: 60 TABLET | Refills: 11 | Status: SHIPPED | OUTPATIENT
Start: 2022-05-24 | End: 2023-09-06

## 2022-05-24 RX ORDER — FUROSEMIDE 20 MG/1
40 TABLET ORAL 2 TIMES DAILY
Qty: 120 TABLET | Refills: 11 | Status: SHIPPED | OUTPATIENT
Start: 2022-05-24 | End: 2023-09-06

## 2022-05-24 NOTE — PROGRESS NOTES
Subjective:   05/24/2022     Patient ID:  Merle Borden is a 81 y.o. female who presents for evaulation of Edema      She comes in today for follow-up.  Her lower extremity swelling appears to be improved.  She had had a bilateral lower extremity venous Doppler which did show no DVT, but bilateral reflux.  This could be the cause of increased lower extremity swelling along with volume overloaded hypertension.  On increased dose Lasix and spironolactone, her swelling does appear to be improved.  Her blood pressure has improved also.  A BMP showed normal renal function, GFR greater than 60. Her potassium was normal.          I had seen her 6 weeks ago with complaints of swelling and increased blood pressure.  She had had increasing shortness of breath.  She had not had chest pains tightness.  She had not had PND or orthopnea.    She does not have a history of heart problems.  She does have a history of hypertension.  She has also had a subdural hematoma last year.    She has also being readied for a left knee replacement.    She sees Dr. Peña for asthma, her PCP is Dr. Dorantes.    Recommendations made in March of 2022:  1. Increase furosemide to 20 mg tablets, 1 tablet twice a day to get rid of fluid, when fluid improved, okay to decrease to once daily again  2. Add spironolactone 25 mg tablets, 1/2 tablet twice a day with each furosemide, when furosemide decreased to 1 a day, also decrease spironolactone to 1/2 tablet daily  3. Please do a blood test early next week  4. Please do an echocardiogram to assess overall heart strength  5. Will re-evaluate you in 2 weeks    Subsequent echocardiogram:  The left ventricle is normal in size with normal systolic function.  The estimated ejection fraction is 65%.  Normal left ventricular diastolic function.  The estimated PA systolic pressure is 28 mmHg.  Normal right ventricular size with normal right ventricular systolic function.  Normal central venous pressure (3  mmHg).  There is no pulmonary hypertension.    Subsequent lab:   CREATININE 0.8 04/12/2022   BUN              17 04/12/2022   NA             140 04/12/2022   K              4.6 04/12/2022   CO2             30 (H) 04/12/2022    Prior note:  She will increase furosemide and begin spironolactone in a low dose.  Blood work will be rechecked next week.  Echocardiography will be obtained.  This should help improve her swelling and blood pressure.  I would like her to wear support hose are wrapped for legs with Ace bandages, but she is unable to do this.  She lives alone.    When blood pressure and fluid optimize, she will then undergo a stress test to evaluate for myocardial ischemia.      Today, she is here for review.  Despite alteration of her diuretic regimen, she is really not lost any weight, only 1 lb.  She continues to have shortness of breath.  She did eat ham over the weekend though and felt that this seemed to set her back.  She has not had chest pains or tightness.    Recommendations made in April of 2022:  Recommendations:  1. Change furosemide to 20 mg tablets, 2 tablets twice a day and spironolactone to 25 mg twice a day.  2. If swelling improves, decrease furosemide to 40 mg, two 20 mg tablets, every morning all only.  When you decrease the furosemide, also decrease spironolactone to 25 mg 1 tablet every morning.  3. Wrapped both lower extremities with 6 in Ace bandages from the foot to the knee any time she is out of bed.  4. I will schedule a nuclear stress test.  5. Message me next week with an update.        Nuclear stress test showed no evidence for ischemia.      Past Medical History:   Diagnosis Date    Asthma     OP (osteoporosis)        Review of patient's allergies indicates:   Allergen Reactions    Codeine Nausea And Vomiting and Other (See Comments)     Upset stomach         Current Outpatient Medications:     alendronate (FOSAMAX) 70 MG tablet, Take 70 mg by mouth every 7 days., Disp: ,  Rfl:     ALPRAZolam (XANAX) 0.5 MG tablet, Take 0.5 mg by mouth Daily., Disp: , Rfl: 5    atorvastatin calcium (LIPITOR ORAL), Take 80 mg by mouth., Disp: , Rfl:     folic acid/multivit-min/lutein (CENTRUM SILVER ORAL), Take by mouth., Disp: , Rfl:     irbesartan (AVAPRO) 300 MG tablet, Take 300 mg by mouth once daily., Disp: , Rfl:     metoprolol succinate (TOPROL-XL) 25 MG 24 hr tablet, Take 25 mg by mouth once daily., Disp: , Rfl:     montelukast sodium (SINGULAIR ORAL), Take 10 mg by mouth., Disp: , Rfl:     omeprazole (PRILOSEC) 40 MG capsule, Take 40 mg by mouth once daily., Disp: , Rfl:     sertraline (ZOLOFT) 100 MG tablet, Take 100 mg by mouth once daily., Disp: , Rfl:     SYMBICORT 80-4.5 mcg/actuation HFAA, INHALE 2 PUFFS PO INTO LUNGS BID TO PREVENT ASTHMA, Disp: , Rfl: 5    furosemide (LASIX) 20 MG tablet, Take 2 tablets (40 mg total) by mouth 2 (two) times daily. Decrease to 40mg  a day if swelling and blood pressure improves, Disp: 120 tablet, Rfl: 11    spironolactone (ALDACTONE) 25 MG tablet, Take 1 tablet (25 mg total) by mouth 2 (two) times daily. Decrease to 1 tab a day when swelling and blood pressure improved, Disp: 60 tablet, Rfl: 11     Objective:   Review of Systems   Constitutional: Positive for malaise/fatigue and weight gain.   Cardiovascular: Positive for dyspnea on exertion and leg swelling. Negative for chest pain, claudication, cyanosis, irregular heartbeat, near-syncope, orthopnea, palpitations, paroxysmal nocturnal dyspnea and syncope.   Musculoskeletal: Positive for arthritis and back pain.         Vitals:    05/24/22 1514   BP: (!) 111/52   Pulse: 83     Wt Readings from Last 3 Encounters:   05/24/22 72 kg (158 lb 11.7 oz)   05/02/22 73 kg (161 lb)   04/20/22 73.2 kg (161 lb 6 oz)     Temp Readings from Last 3 Encounters:   05/23/16 97.6 °F (36.4 °C) (Oral)   10/06/14 97.1 °F (36.2 °C) (Oral)     BP Readings from Last 3 Encounters:   05/24/22 (!) 111/52   05/02/22 (!)  162/81   04/20/22 134/62     Pulse Readings from Last 3 Encounters:   05/24/22 83   05/02/22 78   04/20/22 83             Physical Exam  Vitals reviewed.   Constitutional:       General: She is not in acute distress.     Appearance: She is well-developed.   HENT:      Head: Normocephalic and atraumatic.      Nose: Nose normal.   Eyes:      Conjunctiva/sclera: Conjunctivae normal.      Pupils: Pupils are equal, round, and reactive to light.   Neck:      Vascular: Hepatojugular reflux present. No JVD.   Cardiovascular:      Rate and Rhythm: Normal rate and regular rhythm.      Pulses: Intact distal pulses.      Heart sounds: Normal heart sounds. No murmur heard.    No friction rub. No gallop.   Pulmonary:      Effort: Pulmonary effort is normal. No respiratory distress.      Breath sounds: Normal breath sounds. No wheezing or rales.   Chest:      Chest wall: No tenderness.   Abdominal:      General: Bowel sounds are normal. There is no distension.      Palpations: Abdomen is soft.      Tenderness: There is no abdominal tenderness.   Musculoskeletal:         General: No tenderness or deformity. Normal range of motion.      Cervical back: Normal range of motion and neck supple.      Right lower leg: Edema (1+) present.      Left lower leg: Edema (1+) present.   Skin:     General: Skin is warm and dry.      Findings: No erythema or rash.   Neurological:      Mental Status: She is alert and oriented to person, place, and time.      Cranial Nerves: No cranial nerve deficit.      Motor: No abnormal muscle tone.      Coordination: Coordination normal.   Psychiatric:         Behavior: Behavior normal.         Thought Content: Thought content normal.         Judgment: Judgment normal.           No results found for: CHOL  No results found for: HDL  No results found for: LDLCALC  No results found for: ALT, AST  Lab Results   Component Value Date    CREATININE 0.8 05/11/2022    BUN 23 05/11/2022     (L) 05/11/2022    K 4.3  05/11/2022    CO2 26 05/11/2022    CO2 30 (H) 04/12/2022     No results found for: HGB, HCT                EKG shows normal sinus rhythm, normal study.    Assessment and Plan:     Preoperative clearance  Comments:  Patient cleared for surgery    Chronic diastolic heart failure  Comments:  Appears improved with intensified diuretic therapy  Orders:  -     furosemide (LASIX) 20 MG tablet; Take 2 tablets (40 mg total) by mouth 2 (two) times daily. Decrease to 40mg  a day if swelling and blood pressure improves  Dispense: 120 tablet; Refill: 11  -     spironolactone (ALDACTONE) 25 MG tablet; Take 1 tablet (25 mg total) by mouth 2 (two) times daily. Decrease to 1 tab a day when swelling and blood pressure improved  Dispense: 60 tablet; Refill: 11    Primary hypertension  Comments:  Blood pressure normalized on beta-blocker, diuretics and irbesartan  Orders:  -     furosemide (LASIX) 20 MG tablet; Take 2 tablets (40 mg total) by mouth 2 (two) times daily. Decrease to 40mg  a day if swelling and blood pressure improves  Dispense: 120 tablet; Refill: 11  -     spironolactone (ALDACTONE) 25 MG tablet; Take 1 tablet (25 mg total) by mouth 2 (two) times daily. Decrease to 1 tab a day when swelling and blood pressure improved  Dispense: 60 tablet; Refill: 11    Pure hypercholesterolemia  Comments:  On high-dose statin    Edema of both lower extremities due to peripheral venous insufficiency  Comments:  Edema seems multifactorial, now improved with systemic and local therapy              Follow up in about 6 months (around 11/24/2022).

## 2022-06-15 ENCOUNTER — TELEPHONE (OUTPATIENT)
Dept: CARDIOLOGY | Facility: CLINIC | Age: 82
End: 2022-06-15
Payer: MEDICARE

## 2022-06-15 DIAGNOSIS — R25.2 CRAMPS OF LOWER EXTREMITY: Primary | ICD-10-CM

## 2022-06-15 NOTE — TELEPHONE ENCOUNTER
Tried to call, no answer into numbers.  She is having cramps in the legs, probably due to excessive diuresis.    She should decrease both furosemide and spironolactone to as needed for swelling.    Laboratory work will be obtained to ensure that the potassium and magnesium are normal.

## 2022-06-15 NOTE — TELEPHONE ENCOUNTER
----- Message from Octavia Jackson MA sent at 6/15/2022 10:24 AM CDT -----  Contact: self  Pt is calling about what happen last night,woke up @4:am with severe pain in feet and toes curling. She's having surgery on 8/1 for knee replacement. Doesn't want to cancel  this surgery. Please call [phone].Last visit 5/24/22.

## 2022-06-15 NOTE — TELEPHONE ENCOUNTER
----- Message from Amanda Barrios MA sent at 6/15/2022  3:38 PM CDT -----  Marcelina the patient is returning Dr. Turcios phone call please call [phone]. Thank you.

## 2022-06-15 NOTE — TELEPHONE ENCOUNTER
Medicine changes reviewed with pt; pt verbally repeated and stated she understood; lab scheduled for 6/20/2022

## 2022-06-20 ENCOUNTER — LAB VISIT (OUTPATIENT)
Dept: LAB | Facility: HOSPITAL | Age: 82
End: 2022-06-20
Attending: INTERNAL MEDICINE
Payer: MEDICARE

## 2022-06-20 DIAGNOSIS — R25.2 CRAMPS OF LOWER EXTREMITY: ICD-10-CM

## 2022-06-20 LAB
ANION GAP SERPL CALC-SCNC: 6 MMOL/L (ref 8–16)
BUN SERPL-MCNC: 16 MG/DL (ref 8–23)
CALCIUM SERPL-MCNC: 9.9 MG/DL (ref 8.7–10.5)
CHLORIDE SERPL-SCNC: 101 MMOL/L (ref 95–110)
CO2 SERPL-SCNC: 31 MMOL/L (ref 23–29)
CREAT SERPL-MCNC: 0.6 MG/DL (ref 0.5–1.4)
EST. GFR  (AFRICAN AMERICAN): >60 ML/MIN/1.73 M^2
EST. GFR  (NON AFRICAN AMERICAN): >60 ML/MIN/1.73 M^2
GLUCOSE SERPL-MCNC: 99 MG/DL (ref 70–110)
MAGNESIUM SERPL-MCNC: 1.8 MG/DL (ref 1.6–2.6)
POTASSIUM SERPL-SCNC: 4.4 MMOL/L (ref 3.5–5.1)
SODIUM SERPL-SCNC: 138 MMOL/L (ref 136–145)

## 2022-06-20 PROCEDURE — 83735 ASSAY OF MAGNESIUM: CPT | Performed by: INTERNAL MEDICINE

## 2022-06-20 PROCEDURE — 36415 COLL VENOUS BLD VENIPUNCTURE: CPT | Mod: PO | Performed by: INTERNAL MEDICINE

## 2022-06-20 PROCEDURE — 80048 BASIC METABOLIC PNL TOTAL CA: CPT | Performed by: INTERNAL MEDICINE

## 2022-07-15 ENCOUNTER — TELEPHONE (OUTPATIENT)
Dept: OBSTETRICS AND GYNECOLOGY | Facility: CLINIC | Age: 82
End: 2022-07-15
Payer: MEDICARE

## 2022-07-15 DIAGNOSIS — Z12.31 SCREENING MAMMOGRAM, ENCOUNTER FOR: Primary | ICD-10-CM

## 2022-07-15 NOTE — TELEPHONE ENCOUNTER
----- Message from Sofie Hill sent at 7/15/2022 11:00 AM CDT -----  Patient called asking for a call back to schedule her yearly and mammogram.  She can be reached at [phone]

## 2022-11-14 ENCOUNTER — TELEPHONE (OUTPATIENT)
Dept: CARDIOLOGY | Facility: CLINIC | Age: 82
End: 2022-11-14
Payer: MEDICARE

## 2022-11-14 NOTE — TELEPHONE ENCOUNTER
----- Message from Maris Blankenship sent at 11/14/2022 12:09 PM CST -----  Regarding: Call back  PT is requesting a call back from Marcelina.  PT can be reached @ [phone]      Thanks

## 2022-11-16 ENCOUNTER — OFFICE VISIT (OUTPATIENT)
Dept: CARDIOLOGY | Facility: CLINIC | Age: 82
End: 2022-11-16
Payer: MEDICARE

## 2022-11-16 VITALS
SYSTOLIC BLOOD PRESSURE: 131 MMHG | HEIGHT: 61 IN | RESPIRATION RATE: 20 BRPM | WEIGHT: 158 LBS | HEART RATE: 73 BPM | BODY MASS INDEX: 29.83 KG/M2 | DIASTOLIC BLOOD PRESSURE: 75 MMHG

## 2022-11-16 DIAGNOSIS — E78.00 PURE HYPERCHOLESTEROLEMIA: ICD-10-CM

## 2022-11-16 DIAGNOSIS — I87.2 VENOUS INSUFFICIENCY: Primary | ICD-10-CM

## 2022-11-16 DIAGNOSIS — I10 PRIMARY HYPERTENSION: ICD-10-CM

## 2022-11-16 PROCEDURE — 1159F PR MEDICATION LIST DOCUMENTED IN MEDICAL RECORD: ICD-10-PCS | Mod: CPTII,S$GLB,, | Performed by: INTERNAL MEDICINE

## 2022-11-16 PROCEDURE — 3075F PR MOST RECENT SYSTOLIC BLOOD PRESS GE 130-139MM HG: ICD-10-PCS | Mod: CPTII,S$GLB,, | Performed by: INTERNAL MEDICINE

## 2022-11-16 PROCEDURE — 3078F PR MOST RECENT DIASTOLIC BLOOD PRESSURE < 80 MM HG: ICD-10-PCS | Mod: CPTII,S$GLB,, | Performed by: INTERNAL MEDICINE

## 2022-11-16 PROCEDURE — 99999 PR PBB SHADOW E&M-EST. PATIENT-LVL III: CPT | Mod: PBBFAC,,, | Performed by: INTERNAL MEDICINE

## 2022-11-16 PROCEDURE — 3078F DIAST BP <80 MM HG: CPT | Mod: CPTII,S$GLB,, | Performed by: INTERNAL MEDICINE

## 2022-11-16 PROCEDURE — 3075F SYST BP GE 130 - 139MM HG: CPT | Mod: CPTII,S$GLB,, | Performed by: INTERNAL MEDICINE

## 2022-11-16 PROCEDURE — 1160F PR REVIEW ALL MEDS BY PRESCRIBER/CLIN PHARMACIST DOCUMENTED: ICD-10-PCS | Mod: CPTII,S$GLB,, | Performed by: INTERNAL MEDICINE

## 2022-11-16 PROCEDURE — 3288F FALL RISK ASSESSMENT DOCD: CPT | Mod: CPTII,S$GLB,, | Performed by: INTERNAL MEDICINE

## 2022-11-16 PROCEDURE — 1125F PR PAIN SEVERITY QUANTIFIED, PAIN PRESENT: ICD-10-PCS | Mod: CPTII,S$GLB,, | Performed by: INTERNAL MEDICINE

## 2022-11-16 PROCEDURE — 99204 OFFICE O/P NEW MOD 45 MIN: CPT | Mod: S$GLB,,, | Performed by: INTERNAL MEDICINE

## 2022-11-16 PROCEDURE — 3288F PR FALLS RISK ASSESSMENT DOCUMENTED: ICD-10-PCS | Mod: CPTII,S$GLB,, | Performed by: INTERNAL MEDICINE

## 2022-11-16 PROCEDURE — 1101F PT FALLS ASSESS-DOCD LE1/YR: CPT | Mod: CPTII,S$GLB,, | Performed by: INTERNAL MEDICINE

## 2022-11-16 PROCEDURE — 1159F MED LIST DOCD IN RCRD: CPT | Mod: CPTII,S$GLB,, | Performed by: INTERNAL MEDICINE

## 2022-11-16 PROCEDURE — 99999 PR PBB SHADOW E&M-EST. PATIENT-LVL III: ICD-10-PCS | Mod: PBBFAC,,, | Performed by: INTERNAL MEDICINE

## 2022-11-16 PROCEDURE — 1160F RVW MEDS BY RX/DR IN RCRD: CPT | Mod: CPTII,S$GLB,, | Performed by: INTERNAL MEDICINE

## 2022-11-16 PROCEDURE — 1125F AMNT PAIN NOTED PAIN PRSNT: CPT | Mod: CPTII,S$GLB,, | Performed by: INTERNAL MEDICINE

## 2022-11-16 PROCEDURE — 1101F PR PT FALLS ASSESS DOC 0-1 FALLS W/OUT INJ PAST YR: ICD-10-PCS | Mod: CPTII,S$GLB,, | Performed by: INTERNAL MEDICINE

## 2022-11-16 PROCEDURE — 99204 PR OFFICE/OUTPT VISIT, NEW, LEVL IV, 45-59 MIN: ICD-10-PCS | Mod: S$GLB,,, | Performed by: INTERNAL MEDICINE

## 2022-11-16 NOTE — PROGRESS NOTES
HISTORY:    81 yo F w a h/o hypertension, hyperlipidemia, venous insufficiency, and B LE OA s/p B TKR presenting for initial evaluation by me. Pt referred by Dr. Turcios for evaluation of venous insufficiency.    The patient has a long h/o prominent LE spider veins and varicosities for decades. Have progressed recently and now is associated with B LE edema that is worse at the end of the day with heaviness and some itching at night. S seemed to progress post LLE TKR and exercise capacity has decreased since as well. She is being evaluated for L THR at this time due to LLE pain. She has a lateral LLE knee pain that is hooting in nature.     She has no h/o VTE. + FH varicose veins.     The patient denies any previous history of myocardial infarction, coronary artery disease, peripheral arterial disease, stroke, congestive heart failure, or cardiomyopathy.    Bps controlled on below regimen.       MEDICATIONS:      Current Outpatient Medications:     alendronate (FOSAMAX) 70 MG tablet, Take 70 mg by mouth every 7 days., Disp: , Rfl:     ALPRAZolam (XANAX) 0.5 MG tablet, Take 0.5 mg by mouth Daily., Disp: , Rfl: 5    atorvastatin calcium (LIPITOR ORAL), Take 80 mg by mouth., Disp: , Rfl:     folic acid/multivit-min/lutein (CENTRUM SILVER ORAL), Take by mouth., Disp: , Rfl:     furosemide (LASIX) 20 MG tablet, Take 2 tablets (40 mg total) by mouth 2 (two) times daily. Decrease to 40mg  a day if swelling and blood pressure improves, Disp: 120 tablet, Rfl: 11    irbesartan (AVAPRO) 300 MG tablet, Take 300 mg by mouth once daily., Disp: , Rfl:     metoprolol succinate (TOPROL-XL) 25 MG 24 hr tablet, Take 25 mg by mouth once daily., Disp: , Rfl:     montelukast sodium (SINGULAIR ORAL), Take 10 mg by mouth., Disp: , Rfl:     omeprazole (PRILOSEC) 40 MG capsule, Take 40 mg by mouth once daily., Disp: , Rfl:     sertraline (ZOLOFT) 100 MG tablet, Take 100 mg by mouth once daily., Disp: , Rfl:     spironolactone (ALDACTONE)  25 MG tablet, Take 1 tablet (25 mg total) by mouth 2 (two) times daily. Decrease to 1 tab a day when swelling and blood pressure improved, Disp: 60 tablet, Rfl: 11    SYMBICORT 80-4.5 mcg/actuation HFAA, INHALE 2 PUFFS PO INTO LUNGS BID TO PREVENT ASTHMA, Disp: , Rfl: 5      PHYSICAL EXAM:    Vitals:    11/16/22 1508   BP: 131/75   Pulse: 73   Resp: 20       NAD, A+Ox3.  No jvd, no bruit.  RRR nml s1,s2. No murmurs.  CTA B no wheezes or crackles.  2+ B radial and 1+ B DP/PT. B LE edema L>R,     LABS/STUDIES (imaging reviewed during clinic visit):    June 2022 Cr 0.6/BUN 16. NT-proBNP normal.   EKG 2022 NSR no Qs/STs  TTE 4/22 Nml LV size and function. CVP 3.   NST 5/22 Nml LVEF no e/o ischemia.   Venous duplex. 5/22 No e/o DVT bilaterally. R GSV and L GSV/SSV reflux.    ASSESSMENT & PLAN:    1. Venous insufficiency    2. Primary hypertension    3. Pure hypercholesterolemia        Orders Placed This Encounter    COMPRESSION STOCKINGS        Patient with C3, Ep, As, Pr bilaterally. Tolerating diuretics. Start compression therapy. Really needs to increase activity levels. Pt is planning on undergoing L THR. Will reassess after she is fully mobile. LLE lateral knee pain unlikely to be related to venous disease.    Agree with repeat DVT study, pt states that her PCP has scheduled her at [facility] next Monday.     Bps controlled on metoprolol, irbesartan, spironolactone, and furosemide. On high dose atorvastatin 80x1.         Phil Meraz MD

## 2022-12-14 ENCOUNTER — TELEPHONE (OUTPATIENT)
Dept: CARDIOLOGY | Facility: CLINIC | Age: 82
End: 2022-12-14
Payer: MEDICARE

## 2023-08-08 ENCOUNTER — TELEPHONE (OUTPATIENT)
Dept: OBSTETRICS AND GYNECOLOGY | Facility: CLINIC | Age: 83
End: 2023-08-08
Payer: MEDICARE

## 2023-08-08 DIAGNOSIS — Z12.31 SCREENING MAMMOGRAM, ENCOUNTER FOR: Primary | ICD-10-CM

## 2023-08-15 ENCOUNTER — HOSPITAL ENCOUNTER (OUTPATIENT)
Dept: RADIOLOGY | Facility: HOSPITAL | Age: 83
Discharge: HOME | End: 2023-08-15
Attending: OBSTETRICS & GYNECOLOGY
Payer: MEDICARE

## 2023-08-15 DIAGNOSIS — Z12.31 SCREENING MAMMOGRAM, ENCOUNTER FOR: ICD-10-CM

## 2023-08-15 PROCEDURE — 77067 MAMMO DIGITAL SCREENING BILAT WITH TOMO: ICD-10-PCS | Mod: 26,,, | Performed by: RADIOLOGY

## 2023-08-15 PROCEDURE — 77063 BREAST TOMOSYNTHESIS BI: CPT | Mod: 26,,, | Performed by: RADIOLOGY

## 2023-08-15 PROCEDURE — 77067 SCR MAMMO BI INCL CAD: CPT | Mod: 26,,, | Performed by: RADIOLOGY

## 2023-08-15 PROCEDURE — 77063 MAMMO DIGITAL SCREENING BILAT WITH TOMO: ICD-10-PCS | Mod: 26,,, | Performed by: RADIOLOGY

## 2023-08-15 PROCEDURE — 77067 SCR MAMMO BI INCL CAD: CPT | Mod: TC,PO

## 2023-09-06 ENCOUNTER — OFFICE VISIT (OUTPATIENT)
Dept: OBSTETRICS AND GYNECOLOGY | Facility: CLINIC | Age: 83
End: 2023-09-06
Payer: MEDICARE

## 2023-09-06 VITALS
SYSTOLIC BLOOD PRESSURE: 160 MMHG | DIASTOLIC BLOOD PRESSURE: 82 MMHG | WEIGHT: 168.88 LBS | HEIGHT: 61 IN | BODY MASS INDEX: 31.88 KG/M2

## 2023-09-06 DIAGNOSIS — Z01.419 WELL WOMAN EXAM WITH ROUTINE GYNECOLOGICAL EXAM: Primary | ICD-10-CM

## 2023-09-06 DIAGNOSIS — N95.2 POSTMENOPAUSAL ATROPHIC VAGINITIS: ICD-10-CM

## 2023-09-06 DIAGNOSIS — Z12.31 SCREENING MAMMOGRAM, ENCOUNTER FOR: ICD-10-CM

## 2023-09-06 DIAGNOSIS — N81.10 CYSTOCELE, UNSPECIFIED: ICD-10-CM

## 2023-09-06 DIAGNOSIS — Z78.0 POST-MENOPAUSAL: ICD-10-CM

## 2023-09-06 DIAGNOSIS — I89.0 LYMPHEDEMA: ICD-10-CM

## 2023-09-06 PROCEDURE — 3079F DIAST BP 80-89 MM HG: CPT | Mod: CPTII,S$GLB,, | Performed by: OBSTETRICS & GYNECOLOGY

## 2023-09-06 PROCEDURE — 1159F PR MEDICATION LIST DOCUMENTED IN MEDICAL RECORD: ICD-10-PCS | Mod: CPTII,S$GLB,, | Performed by: OBSTETRICS & GYNECOLOGY

## 2023-09-06 PROCEDURE — 1101F PR PT FALLS ASSESS DOC 0-1 FALLS W/OUT INJ PAST YR: ICD-10-PCS | Mod: CPTII,S$GLB,, | Performed by: OBSTETRICS & GYNECOLOGY

## 2023-09-06 PROCEDURE — 99999 PR PBB SHADOW E&M-EST. PATIENT-LVL III: ICD-10-PCS | Mod: PBBFAC,,, | Performed by: OBSTETRICS & GYNECOLOGY

## 2023-09-06 PROCEDURE — 3288F PR FALLS RISK ASSESSMENT DOCUMENTED: ICD-10-PCS | Mod: CPTII,S$GLB,, | Performed by: OBSTETRICS & GYNECOLOGY

## 2023-09-06 PROCEDURE — 3079F PR MOST RECENT DIASTOLIC BLOOD PRESSURE 80-89 MM HG: ICD-10-PCS | Mod: CPTII,S$GLB,, | Performed by: OBSTETRICS & GYNECOLOGY

## 2023-09-06 PROCEDURE — 1126F AMNT PAIN NOTED NONE PRSNT: CPT | Mod: CPTII,S$GLB,, | Performed by: OBSTETRICS & GYNECOLOGY

## 2023-09-06 PROCEDURE — 1101F PT FALLS ASSESS-DOCD LE1/YR: CPT | Mod: CPTII,S$GLB,, | Performed by: OBSTETRICS & GYNECOLOGY

## 2023-09-06 PROCEDURE — 1126F PR PAIN SEVERITY QUANTIFIED, NO PAIN PRESENT: ICD-10-PCS | Mod: CPTII,S$GLB,, | Performed by: OBSTETRICS & GYNECOLOGY

## 2023-09-06 PROCEDURE — 3077F PR MOST RECENT SYSTOLIC BLOOD PRESSURE >= 140 MM HG: ICD-10-PCS | Mod: CPTII,S$GLB,, | Performed by: OBSTETRICS & GYNECOLOGY

## 2023-09-06 PROCEDURE — G0101 PR CA SCREEN;PELVIC/BREAST EXAM: ICD-10-PCS | Mod: S$GLB,,, | Performed by: OBSTETRICS & GYNECOLOGY

## 2023-09-06 PROCEDURE — 3288F FALL RISK ASSESSMENT DOCD: CPT | Mod: CPTII,S$GLB,, | Performed by: OBSTETRICS & GYNECOLOGY

## 2023-09-06 PROCEDURE — 99999 PR PBB SHADOW E&M-EST. PATIENT-LVL III: CPT | Mod: PBBFAC,,, | Performed by: OBSTETRICS & GYNECOLOGY

## 2023-09-06 PROCEDURE — 3077F SYST BP >= 140 MM HG: CPT | Mod: CPTII,S$GLB,, | Performed by: OBSTETRICS & GYNECOLOGY

## 2023-09-06 PROCEDURE — G0101 CA SCREEN;PELVIC/BREAST EXAM: HCPCS | Mod: S$GLB,,, | Performed by: OBSTETRICS & GYNECOLOGY

## 2023-09-06 PROCEDURE — 1159F MED LIST DOCD IN RCRD: CPT | Mod: CPTII,S$GLB,, | Performed by: OBSTETRICS & GYNECOLOGY

## 2023-09-06 RX ORDER — ALBUTEROL SULFATE 90 UG/1
2 AEROSOL, METERED RESPIRATORY (INHALATION) EVERY 6 HOURS PRN
COMMUNITY

## 2023-09-06 RX ORDER — ATORVASTATIN CALCIUM 80 MG/1
80 TABLET, FILM COATED ORAL
COMMUNITY
Start: 2023-08-01

## 2023-09-06 RX ORDER — MONTELUKAST SODIUM 10 MG/1
10 TABLET ORAL
COMMUNITY
Start: 2023-08-31

## 2023-09-06 RX ORDER — ARIPIPRAZOLE 5 MG/1
5 TABLET ORAL
COMMUNITY
Start: 2023-07-20

## 2023-09-06 NOTE — PROGRESS NOTES
Subjective:     Patient ID: Merle Borden is a 83 y.o. female.     Chief Complaint: Well Woman     History of Present Illness: This patient is a 83 y.o.  female, who presents to the GYN clinic for her gyn well woman yearly exam.  She has developed lymphedema.  She denies gyn complaints.      No LMP recorded. Patient has had a hysterectomy.    Review of Systems    GENERAL: No fever, chills, fatigability or weightchange  SKIN: No rashes, itching or changes in color or texture of skin.  HEAD: No headaches or recent head trauma.  EYES: Visual acuity fine. No photophobia,r diplopia.  EARS: Denies earache or vertigo  NOSE: No loss of smell, no epistaxis or postnasal drip.  MOUTH & THROAT: No hoarseness or change in voice.   NODES: Denies swollen glands.  CHEST: Denies WHEAT, cyanosis, wheezing, cough and sputum production.  CARDIOVASCULAR: Denies chest pain, PND, orthopnea or reduced exercise tolerance.  ABDOMEN: Appetite fine. No weight loss. bloating, Denies diarrhea, abdominal pain, hematemesis or blood in stool.  URINARY: No flank pain, dysuria or hematuria.  PERIPHERAL VASCULAR: No claudication or cyanosis.Varicosities  MUSCULOSKELETAL: No joint stiffness or swelling. Denies back pain.muscle aches  NEUROLOGIC: No history of seizures, paralysis, alteration of gait or coordination.       Objective:       Physical Exam     APPEARANCE: Well nourished, well developed, in no acute distress.    GENITOURINARY:  Vulva: No lesions. Normal female genital architecture.  Urethral Meatus: Normal size and location, no lesions, no prolapse.  Urethra: No masses, tenderness, prolapse or scarring.  Vagina: thin, atrophic and with decreased rugae, no discharge, midline cystocele and small rectocele noted .  Cervix: Absent  Uterus: Absent  Adnexa: No masses, tenderness or CDS nodularity.  Anus Perineum: No lesions, no relaxation, no external hemorrhoids.  Breasts: Symmetrical, no skin changes or visible lesions. No palpable  masses, nipple discharge or adenopathy bilaterally.  Abdomen: No masses, tenderness, hernia or ascites, no hepatasplenomegaly  Neck: Supple. Symmetric without masses. No thyromegaly.  Skin: No rashes, lesions, ulcers, acne, hirsutism.  Lymphedema noted.  Respiratory: Breath sounds clear bilateraly. Good air movement. No rales. No wheezes.  Cardiovascular: Normal rate. Regular rhythm.  Peripheral/lower extremities: No edema, erythema or tenderness.  Lymphatic: No axillary, neck or groin nodes palp.  Mental Status: Alert, oriented x 3, normal affect and mood.    @PROCEDURE:@           Assessment:      1. Well woman exam with routine gynecological exam    2. Post-menopausal    3. Postmenopausal atrophic vaginitis    4. Cystocele, unspecified    5. Lymphedema    6. Screening mammogram, encounter for               Plan:  No change in gyn regimen.  Return to clinic p.r.n. symptoms or for well-woman exam.                      Orders Placed This Encounter   Procedures    Mammo Digital Screening Bilat w/ Erich

## 2023-11-01 DIAGNOSIS — I50.32 CHRONIC DIASTOLIC HEART FAILURE: ICD-10-CM

## 2023-11-01 DIAGNOSIS — I10 PRIMARY HYPERTENSION: ICD-10-CM

## 2023-11-01 RX ORDER — FUROSEMIDE 20 MG/1
TABLET ORAL
Qty: 360 TABLET | Refills: 10 | OUTPATIENT
Start: 2023-11-01

## 2024-05-02 ENCOUNTER — OFFICE VISIT (OUTPATIENT)
Dept: CARDIOLOGY | Facility: CLINIC | Age: 84
End: 2024-05-02
Payer: MEDICARE

## 2024-05-02 VITALS
HEART RATE: 99 BPM | SYSTOLIC BLOOD PRESSURE: 126 MMHG | BODY MASS INDEX: 30.12 KG/M2 | DIASTOLIC BLOOD PRESSURE: 78 MMHG | WEIGHT: 159.38 LBS

## 2024-05-02 DIAGNOSIS — I50.32 CHRONIC DIASTOLIC HEART FAILURE: ICD-10-CM

## 2024-05-02 DIAGNOSIS — R07.2 PRECORDIAL CHEST PAIN: Primary | ICD-10-CM

## 2024-05-02 DIAGNOSIS — I70.0 ATHEROSCLEROSIS OF AORTA: ICD-10-CM

## 2024-05-02 PROCEDURE — 1101F PT FALLS ASSESS-DOCD LE1/YR: CPT | Mod: CPTII,S$GLB,, | Performed by: INTERNAL MEDICINE

## 2024-05-02 PROCEDURE — 99214 OFFICE O/P EST MOD 30 MIN: CPT | Mod: 25,S$GLB,, | Performed by: INTERNAL MEDICINE

## 2024-05-02 PROCEDURE — 3078F DIAST BP <80 MM HG: CPT | Mod: CPTII,S$GLB,, | Performed by: INTERNAL MEDICINE

## 2024-05-02 PROCEDURE — 99999 PR PBB SHADOW E&M-EST. PATIENT-LVL III: CPT | Mod: PBBFAC,,, | Performed by: INTERNAL MEDICINE

## 2024-05-02 PROCEDURE — 1126F AMNT PAIN NOTED NONE PRSNT: CPT | Mod: CPTII,S$GLB,, | Performed by: INTERNAL MEDICINE

## 2024-05-02 PROCEDURE — 3288F FALL RISK ASSESSMENT DOCD: CPT | Mod: CPTII,S$GLB,, | Performed by: INTERNAL MEDICINE

## 2024-05-02 PROCEDURE — 1159F MED LIST DOCD IN RCRD: CPT | Mod: CPTII,S$GLB,, | Performed by: INTERNAL MEDICINE

## 2024-05-02 PROCEDURE — 3074F SYST BP LT 130 MM HG: CPT | Mod: CPTII,S$GLB,, | Performed by: INTERNAL MEDICINE

## 2024-05-02 PROCEDURE — 93000 ELECTROCARDIOGRAM COMPLETE: CPT | Mod: S$GLB,,, | Performed by: INTERNAL MEDICINE

## 2024-05-02 NOTE — PROGRESS NOTES
Subjective:   05/02/2024     Patient ID:  Merle Borden is a 83 y.o. female who presents for evaulation of Annual Exam      Patient comes in today with episodes of epigastric and anterior chest heaviness with associated shortness breath.  She does get nausea vomiting with that.  She had been seen yesterday by her PCP and referred because of an abnormal EKG.  I repeated her EKG today.  It shows sinus rhythm with left axis deviation and appears to be unchanged compared to an EKG from 2 years ago.    She does not have a history of heart problems.  Nuclear stress test in 2022:   The left ventricle is normal in size with normal systolic function.  The estimated ejection fraction is 65%.  Normal left ventricular diastolic function.  The estimated PA systolic pressure is 28 mmHg.  Normal right ventricular size with normal right ventricular systolic function.  Normal central venous pressure (3 mmHg).  There is no pulmonary hypertension.    She does have hypertension, well controlled on Avapro and metoprolol.    Hypercholesterolemia is treated with high-dose statin therapy.           Prior cardiology note reviewed:  She comes in today for follow-up.  Her lower extremity swelling appears to be improved.  She had had a bilateral lower extremity venous Doppler which did show no DVT, but bilateral reflux.  This could be the cause of increased lower extremity swelling along with volume overloaded hypertension.  On increased dose Lasix and spironolactone, her swelling does appear to be improved.  Her blood pressure has improved also.  A BMP showed normal renal function, GFR greater than 60. Her potassium was normal.          I had seen her 6 weeks ago with complaints of swelling and increased blood pressure.  She had had increasing shortness of breath.  She had not had chest pains tightness.  She had not had PND or orthopnea.    She does not have a history of heart problems.  She does have a history of hypertension.  She  has also had a subdural hematoma last year.    She has also being readied for a left knee replacement.    She sees Dr. Peña for asthma, her PCP is Dr. Dorantes.    Recommendations made in March of 2022:  1. Increase furosemide to 20 mg tablets, 1 tablet twice a day to get rid of fluid, when fluid improved, okay to decrease to once daily again  2. Add spironolactone 25 mg tablets, 1/2 tablet twice a day with each furosemide, when furosemide decreased to 1 a day, also decrease spironolactone to 1/2 tablet daily  3. Please do a blood test early next week  4. Please do an echocardiogram to assess overall heart strength  5. Will re-evaluate you in 2 weeks    Subsequent echocardiogram:  The left ventricle is normal in size with normal systolic function.  The estimated ejection fraction is 65%.  Normal left ventricular diastolic function.  The estimated PA systolic pressure is 28 mmHg.  Normal right ventricular size with normal right ventricular systolic function.  Normal central venous pressure (3 mmHg).  There is no pulmonary hypertension.    Subsequent lab:   CREATININE 0.8 04/12/2022   BUN              17 04/12/2022   NA             140 04/12/2022   K              4.6 04/12/2022   CO2             30 (H) 04/12/2022    Prior note:  She will increase furosemide and begin spironolactone in a low dose.  Blood work will be rechecked next week.  Echocardiography will be obtained.  This should help improve her swelling and blood pressure.  I would like her to wear support hose are wrapped for legs with Ace bandages, but she is unable to do this.  She lives alone.    When blood pressure and fluid optimize, she will then undergo a stress test to evaluate for myocardial ischemia.      Today, she is here for review.  Despite alteration of her diuretic regimen, she is really not lost any weight, only 1 lb.  She continues to have shortness of breath.  She did eat ham over the weekend though and felt that this seemed to set her back.   She has not had chest pains or tightness.    Recommendations made in April of 2022:  Recommendations:  1. Change furosemide to 20 mg tablets, 2 tablets twice a day and spironolactone to 25 mg twice a day.  2. If swelling improves, decrease furosemide to 40 mg, two 20 mg tablets, every morning all only.  When you decrease the furosemide, also decrease spironolactone to 25 mg 1 tablet every morning.  3. Wrapped both lower extremities with 6 in Ace bandages from the foot to the knee any time she is out of bed.  4. I will schedule a nuclear stress test.  5. Message me next week with an update.        Nuclear stress test showed no evidence for ischemia.        Past Medical History:   Diagnosis Date    Asthma     OP (osteoporosis)        Review of patient's allergies indicates:   Allergen Reactions    Codeine Nausea And Vomiting and Other (See Comments)     Upset stomach         Current Outpatient Medications:     albuterol (PROVENTIL/VENTOLIN HFA) 90 mcg/actuation inhaler, Inhale 2 puffs into the lungs every 6 (six) hours as needed., Disp: , Rfl:     alendronate (FOSAMAX) 70 MG tablet, Take 70 mg by mouth every 7 days., Disp: , Rfl:     ARIPiprazole (ABILIFY) 5 MG Tab, Take 5 mg by mouth., Disp: , Rfl:     atorvastatin (LIPITOR) 80 MG tablet, Take 80 mg by mouth., Disp: , Rfl:     folic acid/multivit-min/lutein (CENTRUM SILVER ORAL), Take by mouth., Disp: , Rfl:     irbesartan (AVAPRO) 300 MG tablet, Take 300 mg by mouth once daily., Disp: , Rfl:     metoprolol succinate (TOPROL-XL) 25 MG 24 hr tablet, Take 25 mg by mouth once daily., Disp: , Rfl:     montelukast (SINGULAIR) 10 mg tablet, Take 10 mg by mouth., Disp: , Rfl:     omeprazole (PRILOSEC) 40 MG capsule, Take 40 mg by mouth once daily., Disp: , Rfl:     sertraline (ZOLOFT) 100 MG tablet, Take 100 mg by mouth once daily., Disp: , Rfl:     SYMBICORT 80-4.5 mcg/actuation HFAA, INHALE 2 PUFFS PO INTO LUNGS BID TO PREVENT ASTHMA, Disp: , Rfl: 5     Objective:    Review of Systems   Constitutional: Positive for malaise/fatigue and weight gain.   Cardiovascular:  Positive for dyspnea on exertion and leg swelling. Negative for chest pain, claudication, cyanosis, irregular heartbeat, near-syncope, orthopnea, palpitations, paroxysmal nocturnal dyspnea and syncope.   Musculoskeletal:  Positive for arthritis and back pain.         Vitals:    05/02/24 1436   BP: 126/78   Pulse: 99     Wt Readings from Last 3 Encounters:   05/02/24 72.3 kg (159 lb 6.3 oz)   09/06/23 76.6 kg (168 lb 14 oz)   11/16/22 71.7 kg (158 lb)     Temp Readings from Last 3 Encounters:   05/23/16 97.6 °F (36.4 °C) (Oral)   10/06/14 97.1 °F (36.2 °C) (Oral)     BP Readings from Last 3 Encounters:   05/02/24 126/78   09/06/23 (!) 160/82   11/16/22 131/75     Pulse Readings from Last 3 Encounters:   05/02/24 99   11/16/22 73   05/24/22 83             Physical Exam  Vitals reviewed.   Constitutional:       General: She is not in acute distress.     Appearance: She is well-developed.   HENT:      Head: Normocephalic and atraumatic.      Nose: Nose normal.   Eyes:      Conjunctiva/sclera: Conjunctivae normal.      Pupils: Pupils are equal, round, and reactive to light.   Neck:      Vascular: Hepatojugular reflux present. No carotid bruit or JVD.   Cardiovascular:      Rate and Rhythm: Normal rate and regular rhythm.      Pulses: Normal pulses and intact distal pulses.      Heart sounds: Normal heart sounds. No murmur heard.     No friction rub. No gallop.   Pulmonary:      Effort: Pulmonary effort is normal. No respiratory distress.      Breath sounds: Normal breath sounds. No wheezing or rales.   Chest:      Chest wall: No tenderness.   Abdominal:      General: Bowel sounds are normal. There is no distension.      Palpations: Abdomen is soft.      Tenderness: There is no abdominal tenderness.   Musculoskeletal:         General: No tenderness or deformity. Normal range of motion.      Cervical back: Normal range of  "motion and neck supple.      Right lower leg: Edema (Trace) present.      Left lower leg: Edema (Trace) present.   Skin:     General: Skin is warm and dry.      Findings: No erythema or rash.   Neurological:      Mental Status: She is alert and oriented to person, place, and time.      Cranial Nerves: No cranial nerve deficit.      Motor: No abnormal muscle tone.      Coordination: Coordination normal.   Psychiatric:         Behavior: Behavior normal.         Thought Content: Thought content normal.         Judgment: Judgment normal.           No results found for: "CHOL"  No results found for: "HDL"  No results found for: "LDLCALC"  Lab Results   Component Value Date    ALT 19 12/13/2022    AST 19 12/13/2022    AST 19 07/13/2022    AST 22 03/29/2022     Lab Results   Component Value Date    CREATININE 0.61 12/13/2022    BUN 13.2 12/13/2022     12/13/2022    K 4.2 12/13/2022    CO2 27 12/13/2022    CO2 31 07/13/2022    CO2 31 (H) 06/20/2022     No results found for: "HGB", "HCT"                EKG shows normal sinus rhythm, normal study.    Assessment and Plan:     Precordial chest pain  Comments:  Her epigastric pain sounds atypical for that of myocardial ischemia.  Her EKG being unchanged from the past certainly reassuring.    Chronic diastolic heart failure  Comments:  Clinically stable at present    Atherosclerosis of aorta        Given that her EKG is unchanged, I do not think that I would further her cardiac evaluation at present, but allow her GI evaluation to go on.  She would go to the emergency room if she would develop more prominent heaviness in her chest or increasing shortness of breath.      No follow-ups on file.                            "

## 2024-05-03 LAB
OHS QRS DURATION: 86 MS
OHS QTC CALCULATION: 434 MS

## 2024-08-19 ENCOUNTER — TELEPHONE (OUTPATIENT)
Dept: OBSTETRICS AND GYNECOLOGY | Facility: CLINIC | Age: 84
End: 2024-08-19
Payer: MEDICARE

## 2024-08-19 NOTE — TELEPHONE ENCOUNTER
----- Message from Michael Collins sent at 8/19/2024  4:21 PM CDT -----  Regarding: Appt requested  Contact: [phone]  Hi, pt called to request a call to schedule an appt. Pls call the pt at  [phone].    Thank you.

## 2024-08-22 ENCOUNTER — HOSPITAL ENCOUNTER (OUTPATIENT)
Dept: RADIOLOGY | Facility: HOSPITAL | Age: 84
Discharge: HOME | End: 2024-08-22
Attending: OBSTETRICS & GYNECOLOGY
Payer: MEDICARE

## 2024-08-22 DIAGNOSIS — Z78.0 POST-MENOPAUSAL: ICD-10-CM

## 2024-08-22 DIAGNOSIS — Z01.419 WELL WOMAN EXAM WITH ROUTINE GYNECOLOGICAL EXAM: ICD-10-CM

## 2024-08-22 DIAGNOSIS — Z12.31 SCREENING MAMMOGRAM, ENCOUNTER FOR: ICD-10-CM

## 2024-08-22 DIAGNOSIS — N81.10 CYSTOCELE, UNSPECIFIED: ICD-10-CM

## 2024-08-22 DIAGNOSIS — N95.2 POSTMENOPAUSAL ATROPHIC VAGINITIS: ICD-10-CM

## 2024-08-22 PROCEDURE — 77067 SCR MAMMO BI INCL CAD: CPT | Mod: TC

## 2024-09-23 ENCOUNTER — TELEPHONE (OUTPATIENT)
Dept: OBSTETRICS AND GYNECOLOGY | Facility: CLINIC | Age: 84
End: 2024-09-23
Payer: MEDICARE

## 2024-09-23 NOTE — TELEPHONE ENCOUNTER
----- Message from Jamel Solano sent at 9/23/2024  2:48 PM CDT -----  Regarding: Self  [phone]  Type: Patient Call Back    Who called: Self     What is the request in detail: called to reschedule her appt. Nothing available in epic. Would like a call back.   Stated she wants to change it to October 23rd.     Can the clinic reply by [facility]? No     Would the patient rather a call back or a response via [facility]? Call back     Best call back number: [phone]     Additional Information:    Thank you.

## 2025-04-10 ENCOUNTER — OFFICE VISIT (OUTPATIENT)
Dept: CARDIOLOGY | Facility: CLINIC | Age: 85
End: 2025-04-10
Payer: MEDICARE

## 2025-04-10 VITALS
SYSTOLIC BLOOD PRESSURE: 124 MMHG | BODY MASS INDEX: 27.83 KG/M2 | DIASTOLIC BLOOD PRESSURE: 69 MMHG | WEIGHT: 147.25 LBS | HEART RATE: 89 BPM

## 2025-04-10 DIAGNOSIS — I50.32 CHRONIC DIASTOLIC HEART FAILURE: Primary | ICD-10-CM

## 2025-04-10 DIAGNOSIS — I10 PRIMARY HYPERTENSION: ICD-10-CM

## 2025-04-10 DIAGNOSIS — I25.10 CORONARY ATHEROSCLEROSIS DUE TO CALCIFIED CORONARY LESION: ICD-10-CM

## 2025-04-10 DIAGNOSIS — E78.00 PURE HYPERCHOLESTEROLEMIA: ICD-10-CM

## 2025-04-10 DIAGNOSIS — I25.84 CORONARY ATHEROSCLEROSIS DUE TO CALCIFIED CORONARY LESION: ICD-10-CM

## 2025-04-10 DIAGNOSIS — I87.2 VENOUS INSUFFICIENCY: ICD-10-CM

## 2025-04-10 DIAGNOSIS — I87.2 CHRONIC VENOUS INSUFFICIENCY OF LOWER EXTREMITY: ICD-10-CM

## 2025-04-10 PROBLEM — S22.080A COMPRESSION FRACTURE OF T12 VERTEBRA: Status: ACTIVE | Noted: 2024-12-19

## 2025-04-10 PROBLEM — M81.0 AGE-RELATED OSTEOPOROSIS WITHOUT CURRENT PATHOLOGICAL FRACTURE: Status: ACTIVE | Noted: 2024-02-06

## 2025-04-10 PROBLEM — M17.12 ARTHRITIS OF KNEE, LEFT: Status: ACTIVE | Noted: 2022-08-01

## 2025-04-10 PROBLEM — N39.3 STRESS INCONTINENCE (FEMALE) (MALE): Status: ACTIVE | Noted: 2024-02-06

## 2025-04-10 PROBLEM — R94.31 ABNORMAL EKG: Status: ACTIVE | Noted: 2024-05-01

## 2025-04-10 PROBLEM — K52.9 CHRONIC DIARRHEA: Status: ACTIVE | Noted: 2024-09-10

## 2025-04-10 PROBLEM — J47.9 BRONCHIECTASIS, UNCOMPLICATED: Status: ACTIVE | Noted: 2024-02-06

## 2025-04-10 PROBLEM — R73.03 PREDIABETES: Status: ACTIVE | Noted: 2024-02-06

## 2025-04-10 PROBLEM — J45.30 MILD PERSISTENT ASTHMA WITHOUT COMPLICATION: Status: ACTIVE | Noted: 2022-08-01

## 2025-04-10 PROBLEM — I89.0 LYMPHEDEMA, NOT ELSEWHERE CLASSIFIED: Status: ACTIVE | Noted: 2024-02-06

## 2025-04-10 PROCEDURE — 99214 OFFICE O/P EST MOD 30 MIN: CPT | Mod: S$GLB,,, | Performed by: INTERNAL MEDICINE

## 2025-04-10 PROCEDURE — 3078F DIAST BP <80 MM HG: CPT | Mod: CPTII,S$GLB,, | Performed by: INTERNAL MEDICINE

## 2025-04-10 PROCEDURE — 3288F FALL RISK ASSESSMENT DOCD: CPT | Mod: CPTII,S$GLB,, | Performed by: INTERNAL MEDICINE

## 2025-04-10 PROCEDURE — 3074F SYST BP LT 130 MM HG: CPT | Mod: CPTII,S$GLB,, | Performed by: INTERNAL MEDICINE

## 2025-04-10 PROCEDURE — 1159F MED LIST DOCD IN RCRD: CPT | Mod: CPTII,S$GLB,, | Performed by: INTERNAL MEDICINE

## 2025-04-10 PROCEDURE — 1126F AMNT PAIN NOTED NONE PRSNT: CPT | Mod: CPTII,S$GLB,, | Performed by: INTERNAL MEDICINE

## 2025-04-10 PROCEDURE — 99999 PR PBB SHADOW E&M-EST. PATIENT-LVL III: CPT | Mod: PBBFAC,,, | Performed by: INTERNAL MEDICINE

## 2025-04-10 PROCEDURE — 1101F PT FALLS ASSESS-DOCD LE1/YR: CPT | Mod: CPTII,S$GLB,, | Performed by: INTERNAL MEDICINE

## 2025-04-10 NOTE — PROGRESS NOTES
Subjective:   04/10/2025     Patient ID:  Merle Borden is a 84 y.o. female who presents for evaulation of Shortness of Breath and Annual Exam      Patient comes in today with worsening shortness of breath.  She does see her pulmonologist, she does have mild COPD.  She has chronic lower extremity swelling due to lymphedema.    She does not have a history of heart problems.  Nuclear stress test in 2022:   The left ventricle is normal in size with normal systolic function.  The estimated ejection fraction is 65%.  Normal left ventricular diastolic function.  The estimated PA systolic pressure is 28 mmHg.  Normal right ventricular size with normal right ventricular systolic function.  Normal central venous pressure (3 mmHg).  There is no pulmonary hypertension.    She does have hypertension, well controlled on Avapro and metoprolol.    Hypercholesterolemia is treated with high-dose statin therapy, follows up with PCP.           Prior cardiology note reviewed:  She comes in today for follow-up.  Her lower extremity swelling appears to be improved.  She had had a bilateral lower extremity venous Doppler which did show no DVT, but bilateral reflux.  This could be the cause of increased lower extremity swelling along with volume overloaded hypertension.  On increased dose Lasix and spironolactone, her swelling does appear to be improved.  Her blood pressure has improved also.  A BMP showed normal renal function, GFR greater than 60. Her potassium was normal.        Precordial chest pain  Comments:  Her epigastric pain sounds atypical for that of myocardial ischemia.  Her EKG being unchanged from the past certainly reassuring.    Chronic diastolic heart failure  Comments:  Clinically stable at present    Atherosclerosis of aorta        Given that her EKG is unchanged, I do not think that I would further her cardiac evaluation at present, but allow her GI evaluation to go on.  She would go to the emergency room if  she would develop more prominent heaviness in her chest or increasing shortness of breath.       I had seen her 6 weeks ago with complaints of swelling and increased blood pressure.  She had had increasing shortness of breath.  She had not had chest pains tightness.  She had not had PND or orthopnea.    She does not have a history of heart problems.  She does have a history of hypertension.  She has also had a subdural hematoma last year.    She has also being readied for a left knee replacement.    She sees Dr. Peña for asthma, her PCP is Dr. Dorantes.    Recommendations made in March of 2022:  1. Increase furosemide to 20 mg tablets, 1 tablet twice a day to get rid of fluid, when fluid improved, okay to decrease to once daily again  2. Add spironolactone 25 mg tablets, 1/2 tablet twice a day with each furosemide, when furosemide decreased to 1 a day, also decrease spironolactone to 1/2 tablet daily  3. Please do a blood test early next week  4. Please do an echocardiogram to assess overall heart strength  5. Will re-evaluate you in 2 weeks    Subsequent echocardiogram:  The left ventricle is normal in size with normal systolic function.  The estimated ejection fraction is 65%.  Normal left ventricular diastolic function.  The estimated PA systolic pressure is 28 mmHg.  Normal right ventricular size with normal right ventricular systolic function.  Normal central venous pressure (3 mmHg).  There is no pulmonary hypertension.    Subsequent lab:   CREATININE 0.8 04/12/2022   BUN              17 04/12/2022   NA             140 04/12/2022   K              4.6 04/12/2022   CO2             30 (H) 04/12/2022    Prior note:  She will increase furosemide and begin spironolactone in a low dose.  Blood work will be rechecked next week.  Echocardiography will be obtained.  This should help improve her swelling and blood pressure.  I would like her to wear support hose are wrapped for legs with Ace bandages, but she is unable  to do this.  She lives alone.    When blood pressure and fluid optimize, she will then undergo a stress test to evaluate for myocardial ischemia.      Today, she is here for review.  Despite alteration of her diuretic regimen, she is really not lost any weight, only 1 lb.  She continues to have shortness of breath.  She did eat ham over the weekend though and felt that this seemed to set her back.  She has not had chest pains or tightness.    Recommendations made in April of 2022:  Recommendations:  1. Change furosemide to 20 mg tablets, 2 tablets twice a day and spironolactone to 25 mg twice a day.  2. If swelling improves, decrease furosemide to 40 mg, two 20 mg tablets, every morning all only.  When you decrease the furosemide, also decrease spironolactone to 25 mg 1 tablet every morning.  3. Wrapped both lower extremities with 6 in Ace bandages from the foot to the knee any time she is out of bed.  4. I will schedule a nuclear stress test.  5. Message me next week with an update.        Nuclear stress test showed no evidence for ischemia.        Past Medical History:   Diagnosis Date    Asthma     OP (osteoporosis)        Review of patient's allergies indicates:   Allergen Reactions    Codeine Nausea And Vomiting and Other (See Comments)     Upset stomach         Current Outpatient Medications:     albuterol (PROVENTIL/VENTOLIN HFA) 90 mcg/actuation inhaler, Inhale 2 puffs into the lungs every 6 (six) hours as needed., Disp: , Rfl:     alendronate (FOSAMAX) 70 MG tablet, Take 70 mg by mouth every 7 days., Disp: , Rfl:     ARIPiprazole (ABILIFY) 5 MG Tab, Take 5 mg by mouth., Disp: , Rfl:     atorvastatin (LIPITOR) 80 MG tablet, Take 80 mg by mouth., Disp: , Rfl:     folic acid/multivit-min/lutein (CENTRUM SILVER ORAL), Take by mouth., Disp: , Rfl:     irbesartan (AVAPRO) 300 MG tablet, Take 300 mg by mouth once daily., Disp: , Rfl:     metoprolol succinate (TOPROL-XL) 25 MG 24 hr tablet, Take 25 mg by mouth once  daily., Disp: , Rfl:     montelukast (SINGULAIR) 10 mg tablet, Take 10 mg by mouth., Disp: , Rfl:     omeprazole (PRILOSEC) 40 MG capsule, Take 40 mg by mouth once daily., Disp: , Rfl:     sertraline (ZOLOFT) 100 MG tablet, Take 100 mg by mouth once daily., Disp: , Rfl:     SYMBICORT 80-4.5 mcg/actuation HFAA, INHALE 2 PUFFS PO INTO LUNGS BID TO PREVENT ASTHMA, Disp: , Rfl: 5    empagliflozin (JARDIANCE) 10 mg tablet, 1/2 tab daily, Disp: 30 tablet, Rfl: 11     Objective:   Review of Systems   Constitutional: Positive for malaise/fatigue and weight gain.   Cardiovascular:  Positive for dyspnea on exertion and leg swelling. Negative for chest pain, claudication, cyanosis, irregular heartbeat, near-syncope, orthopnea, palpitations, paroxysmal nocturnal dyspnea and syncope.   Musculoskeletal:  Positive for arthritis and back pain.         Vitals:    04/10/25 1143   BP: 124/69   Pulse: 89     Wt Readings from Last 3 Encounters:   04/10/25 66.8 kg (147 lb 4.3 oz)   05/02/24 72.3 kg (159 lb 6.3 oz)   09/06/23 76.6 kg (168 lb 14 oz)     Temp Readings from Last 3 Encounters:   05/23/16 97.6 °F (36.4 °C) (Oral)   10/06/14 97.1 °F (36.2 °C) (Oral)     BP Readings from Last 3 Encounters:   04/10/25 124/69   05/02/24 126/78   09/06/23 (!) 160/82     Pulse Readings from Last 3 Encounters:   04/10/25 89   05/02/24 99   11/16/22 73             Physical Exam  Vitals reviewed.   Constitutional:       General: She is not in acute distress.     Appearance: She is well-developed.   HENT:      Head: Normocephalic and atraumatic.      Nose: Nose normal.   Eyes:      Conjunctiva/sclera: Conjunctivae normal.      Pupils: Pupils are equal, round, and reactive to light.   Neck:      Vascular: Hepatojugular reflux present. No carotid bruit or JVD.   Cardiovascular:      Rate and Rhythm: Normal rate and regular rhythm.      Pulses: Normal pulses and intact distal pulses.      Heart sounds: Normal heart sounds. No murmur heard.     No friction  "rub. No gallop.   Pulmonary:      Effort: Pulmonary effort is normal. No respiratory distress.      Breath sounds: Normal breath sounds. No wheezing or rales.   Chest:      Chest wall: No tenderness.   Abdominal:      General: Bowel sounds are normal. There is no distension.      Palpations: Abdomen is soft.      Tenderness: There is no abdominal tenderness.   Musculoskeletal:         General: No tenderness or deformity. Normal range of motion.      Cervical back: Normal range of motion and neck supple.      Right lower leg: Edema (Trace) present.      Left lower leg: Edema (Trace) present.   Skin:     General: Skin is warm and dry.      Findings: No erythema or rash.   Neurological:      Mental Status: She is alert and oriented to person, place, and time.      Cranial Nerves: No cranial nerve deficit.      Motor: No abnormal muscle tone.      Coordination: Coordination normal.   Psychiatric:         Behavior: Behavior normal.         Thought Content: Thought content normal.         Judgment: Judgment normal.           No results found for: "CHOL"  No results found for: "HDL"  No results found for: "LDLCALC"  Lab Results   Component Value Date    ALT 17 12/19/2024    AST 15 12/19/2024    AST 23 05/01/2024    AST 19 12/13/2022     Lab Results   Component Value Date    CREATININE 0.60 12/19/2024    BUN 18 12/19/2024     12/19/2024    K 4.0 12/19/2024    CO2 29 12/19/2024    CO2 28 05/01/2024    CO2 27 12/13/2022     No results found for: "HGB", "HCT"                EKG shows normal sinus rhythm, normal study.    Assessment and Plan:     Chronic diastolic heart failure  -     IN OFFICE EKG 12-LEAD (to Muse)  -     empagliflozin (JARDIANCE) 10 mg tablet; 1/2 tab daily  Dispense: 30 tablet; Refill: 11  -     Basic Metabolic Panel; Future; Expected date: 04/24/2025  -     NT-Pro Natriuretic Peptide; Future; Expected date: 04/24/2025  -     Echo; Future; Expected date: 07/10/2025    Chronic venous insufficiency of " lower extremity    Coronary atherosclerosis due to calcified coronary lesion    Primary hypertension    Pure hypercholesterolemia    Venous insufficiency      Patient with worsening dyspnea.  She has a history of diastolic heart failure.  Will add SG LT 2 inhibitor therapy, low-dose initially, she will return for follow-up.  Lab in 2 weeks, echo Doppler in 3 months with return to clinic.  Follow up in about 3 months (around 7/10/2025).

## 2025-04-14 DIAGNOSIS — I50.32 CHRONIC DIASTOLIC HEART FAILURE: ICD-10-CM

## 2025-04-14 NOTE — TELEPHONE ENCOUNTER
----- Message from Norah sent at 4/14/2025 10:05 AM CDT -----  Regarding: Prescription  Patient waiting on a call from staff to send her prescription empagliflozin (JARDIANCE) 10 mg tablet to [facility] - [place], [place] - [address] Phone: [phone]: [phone]

## 2025-04-16 ENCOUNTER — TELEPHONE (OUTPATIENT)
Dept: CARDIOLOGY | Facility: CLINIC | Age: 85
End: 2025-04-16
Payer: MEDICARE

## 2025-04-17 ENCOUNTER — TELEPHONE (OUTPATIENT)
Dept: CARDIOLOGY | Facility: CLINIC | Age: 85
End: 2025-04-17
Payer: MEDICARE

## 2025-04-17 NOTE — TELEPHONE ENCOUNTER
----- Message from Norah sent at 4/17/2025  4:06 PM CDT -----  [facility] the [facility], clinic # [id number]. Please call patient back @ [phone]. Thank you Norah

## 2025-04-22 ENCOUNTER — TELEPHONE (OUTPATIENT)
Dept: CARDIOLOGY | Facility: CLINIC | Age: 85
End: 2025-04-22
Payer: MEDICARE

## 2025-04-22 NOTE — TELEPHONE ENCOUNTER
----- Message from Norah sent at 4/22/2025  2:31 PM CDT -----  Regarding: Order  Patient asking for an order to [facility] to [facility]. Please call back @ [phone]. Thank you Norah

## 2025-04-24 ENCOUNTER — TELEPHONE (OUTPATIENT)
Dept: CARDIOLOGY | Facility: CLINIC | Age: 85
End: 2025-04-24
Payer: MEDICARE

## 2025-04-24 NOTE — TELEPHONE ENCOUNTER
----- Message from Beth sent at 4/24/2025  9:38 AM CDT -----  Regarding: echo order  Pls call pt at [phone].  She states that [facility] [phone] has never received the order for her echo that she is trying to get scheduled.Thank you

## 2025-04-30 ENCOUNTER — TELEPHONE (OUTPATIENT)
Dept: CARDIOLOGY | Facility: CLINIC | Age: 85
End: 2025-04-30
Payer: MEDICARE

## 2025-05-13 ENCOUNTER — TELEPHONE (OUTPATIENT)
Dept: CARDIOLOGY | Facility: CLINIC | Age: 85
End: 2025-05-13
Payer: MEDICARE

## 2025-05-13 DIAGNOSIS — I50.32 CHRONIC DIASTOLIC HEART FAILURE: ICD-10-CM

## 2025-05-13 NOTE — TELEPHONE ENCOUNTER
----- Message from Arturo Turcios MD sent at 5/13/2025 12:07 PM CDT -----  Regarding: RE: Results    Echocardiogram normal.  Patient with increasing shortness breath though.  Would like to see her in the office next week.  Please arrange.  She will increase Farxiga to 1 daily.  She has lab work scheduled for the future, please do that lab work this week and I can see her n  ----- Message -----  From: Anahy Ortega CMA  Sent: 5/13/2025  11:33 AM CDT  To: Arturo Turcios Jr., MD  Subject: FW: Results                                      Tam Rose this pt is saying that her breathing is getting a lot worst . She is unaware if you received her results back from [facility] . She also left the number of the location incase they didn't send you her results and we need to call that office to get the results .  ----- Message -----  From: Beth Bronson  Sent: 5/13/2025  11:21 AM CDT  To: Tam Morris Staff  Subject: Results                                          Pt is calling for results on her echo and can be reached at  [phone].Thank you -Gypsy

## 2025-05-13 NOTE — TELEPHONE ENCOUNTER
----- Message from Beth sent at 5/13/2025 11:20 AM CDT -----  Regarding: Results  Pt is calling for results on her echo and can be reached at  [phone].Thank you -Gypsy

## 2025-05-13 NOTE — TELEPHONE ENCOUNTER
----- Message from Norah sent at 5/13/2025  3:26 PM CDT -----  Regarding: Call  Patient called was drop and waiting on a call back from staff. Please call back @ [phone]. Thank you Norah

## 2025-05-23 ENCOUNTER — HOSPITAL ENCOUNTER (OUTPATIENT)
Dept: RADIOLOGY | Facility: HOSPITAL | Age: 85
Discharge: HOME | End: 2025-05-23
Attending: INTERNAL MEDICINE

## 2025-05-23 ENCOUNTER — OFFICE VISIT (OUTPATIENT)
Dept: CARDIOLOGY | Facility: CLINIC | Age: 85
End: 2025-05-23
Payer: MEDICARE

## 2025-05-23 ENCOUNTER — RESULTS FOLLOW-UP (OUTPATIENT)
Dept: CARDIOLOGY | Facility: CLINIC | Age: 85
End: 2025-05-23

## 2025-05-23 VITALS
DIASTOLIC BLOOD PRESSURE: 74 MMHG | BODY MASS INDEX: 27.33 KG/M2 | WEIGHT: 144.63 LBS | HEART RATE: 84 BPM | SYSTOLIC BLOOD PRESSURE: 130 MMHG

## 2025-05-23 DIAGNOSIS — R06.02 SOB (SHORTNESS OF BREATH): ICD-10-CM

## 2025-05-23 DIAGNOSIS — I87.2 CHRONIC VENOUS INSUFFICIENCY OF LOWER EXTREMITY: ICD-10-CM

## 2025-05-23 DIAGNOSIS — R06.02 SOB (SHORTNESS OF BREATH): Primary | ICD-10-CM

## 2025-05-23 DIAGNOSIS — I87.2 VENOUS INSUFFICIENCY: ICD-10-CM

## 2025-05-23 DIAGNOSIS — E78.2 MIXED HYPERLIPIDEMIA: ICD-10-CM

## 2025-05-23 DIAGNOSIS — I25.10 CORONARY ATHEROSCLEROSIS DUE TO CALCIFIED CORONARY LESION: ICD-10-CM

## 2025-05-23 DIAGNOSIS — I25.84 CORONARY ATHEROSCLEROSIS DUE TO CALCIFIED CORONARY LESION: ICD-10-CM

## 2025-05-23 DIAGNOSIS — I50.32 CHRONIC DIASTOLIC HEART FAILURE: ICD-10-CM

## 2025-05-23 DIAGNOSIS — I10 PRIMARY HYPERTENSION: ICD-10-CM

## 2025-05-23 PROCEDURE — 71046 X-RAY EXAM CHEST 2 VIEWS: CPT | Mod: 26,,, | Performed by: RADIOLOGY

## 2025-05-23 PROCEDURE — 99999 PR PBB SHADOW E&M-EST. PATIENT-LVL III: CPT | Mod: PBBFAC,,, | Performed by: INTERNAL MEDICINE

## 2025-05-23 PROCEDURE — 71046 X-RAY EXAM CHEST 2 VIEWS: CPT | Mod: TC

## 2025-05-23 NOTE — PROGRESS NOTES
Subjective:   05/23/2025     Patient ID:  Merle Borden is a 84 y.o. female who presents for evaulation of Shortness of Breath and Congestive Heart Failure       History of Present Illness    CHIEF COMPLAINT:  Patient presents for follow-up of diastolic heart failure and reports dyspnea.    HPI:  Patient has dyspnea, described as constant and heavy breathing. Her daughter reports constant rapid breathing and apparent difficulty with inhalation. The dyspnea occurs after short walks and at rest, worsening with increased activity. She appears comfortable at rest during the visit.    She saw Dr. Lees, a pulmonologist, 2 months ago. Dr. Lees suggested that the breathing issues may be related to scoliosis compressing the vertebrae. She has been diagnosed with mild asthma and takes Singulair and an inhaler for this condition.    Her medications were recently adjusted, with Jardiance being increased from 5 mg to 10 mg daily 2 weeks ago. She is also taking Metoprolol 25 mg daily, Irbesartan 300 mg daily, and Atorvastatin 80 mg daily.    She had labs done recently with Dr. Orta, though the results were not immediately available during the visit. She also had a chest XR and spirometry test with Dr. Lees in March, which was her last checkup.    She denies nocturnal dyspnea, chest discomfort, chest pain, fluid retention, or significant leg swelling.    CARDIAC HISTORY:  Echo 08/2022: normal overall heart strength, EF normal Nuclear stress test 08/2022: no evidence of significant stenosis EKG [id number]:00.000Z: unchanged from previous    MEDICATIONS:  Atorvastatin 80 mg daily for hypercholesterolemia  Irbesartan 300 mg daily for HTN  Metoprolol 25 mg daily for HTN and heart rate control  Jardiance 10 mg daily for diastolic heart failure, diabetes, and kidney health, increased from 5 mg 2 weeks ago Patient is on Singulair and uses an inhaler for asthma management.    TEST RESULTS:  A basic metabolic panel  was conducted on the 14th, revealing glucose 103, BUN 15, creatinine 0.62, GFR 89, sodium 141, potassium 4.3, chloride 104, CO2 27, and calcium 9.1. BNP test on the 14th showed a result of 75, which is within normal limits. Patient underwent spirometry in March. Although specific results were not provided, the doctor mentioned mild asthma.    IMAGING:  An ultrasound of the legs was performed in August 2022, which did not show any blood clots but revealed some venous issues. A chest XR was conducted in March.    MEDICAL HISTORY:  Patient has a history of diastolic heart failure, hypertension, hypercholesterolemia, mild asthma, and scoliosis.    SURGICAL HISTORY:  She has undergone knee surgery and hip surgery.      ROS:  General: -fever, -chills, -fatigue, -weight gain, -weight loss  Eyes: -vision changes, -redness, -discharge  ENT: -ear pain, -nasal congestion, -sore throat  Cardiovascular: -chest pain, -palpitations, +lower extremity edema  Respiratory: -cough, +shortness of breath, +exertional dyspnea, +dyspnea at rest  Gastrointestinal: -abdominal pain, -nausea, -vomiting, -diarrhea, -constipation, -blood in stool  Genitourinary: -dysuria, -hematuria, -frequency  Musculoskeletal: -joint pain, -muscle pain  Skin: -rash, -lesion  Neurological: -headache, -dizziness, -numbness, -tingling  Psychiatric: +anxiety, -depression, -sleep difficulty          Problem List[1]     Review of patient's allergies indicates:   Allergen Reactions    Codeine Nausea And Vomiting and Other (See Comments)     Upset stomach       Current Medications[2]     Objective:   Physical Exam    Vitals: Blood pressure is good. Heart rate is good.  General: No acute distress. Well-developed. Well-nourished.  Eyes: EOMI. Sclerae anicteric.  HENT: Normocephalic. Atraumatic. Nares patent. Moist oral mucosa.  Cardiovascular: Regular rate. Regular rhythm. No murmurs. No rubs. No gallops. Normal S1, S2. Heart sounds pretty good.  Respiratory: Normal  "respiratory effort. Clear to auscultation bilaterally. No rales. No rhonchi. No wheezing.  Musculoskeletal: No  obvious deformity.  Extremities: Legs are somewhat puffy.  Neurological: Alert & oriented x3. No slurred speech. Normal gait.  Psychiatric: Normal mood. Normal affect. Good insight. Good judgment.  Skin: Warm. Dry. No rash.  Neck: Jugular venous pressure is normal.          Vitals:    05/23/25 0941   BP: 130/74   Pulse: 84     Wt Readings from Last 3 Encounters:   05/23/25 65.6 kg (144 lb 10 oz)   04/10/25 66.8 kg (147 lb 4.3 oz)   05/02/24 72.3 kg (159 lb 6.3 oz)     Temp Readings from Last 3 Encounters:   05/23/16 97.6 °F (36.4 °C) (Oral)   10/06/14 97.1 °F (36.2 °C) (Oral)     BP Readings from Last 3 Encounters:   05/23/25 130/74   04/10/25 124/69   05/02/24 126/78     Pulse Readings from Last 3 Encounters:   05/23/25 84   04/10/25 89   05/02/24 99               No results found for: "CHOL"  No results found for: "HDL"  No results found for: "LDLCALC"  Lab Results   Component Value Date    ALT 17 12/19/2024    AST 15 12/19/2024    AST 23 05/01/2024    AST 19 12/13/2022     Lab Results   Component Value Date    CREATININE 0.60 12/19/2024    BUN 18 12/19/2024     12/19/2024    K 4.0 12/19/2024    CO2 29 12/19/2024    CO2 28 05/01/2024    CO2 27 12/13/2022     No results found for: "HGB", "HCT"  Echocardiogram from 05/06/2025 shows normal left ventricular function, mild tricuspid regurgitation, pulmonary hypertension not noted to be present on absent.  Diastolic function noted to be normal.  The tricuspid regurgitant jet is noted actually, peak pulmonary artery systolic pressure estimated to be 25 are less  Assessment and Plan:   Assessment & Plan    I50.32 Chronic diastolic heart failure  R06.02 SOB (shortness of breath)  I87.2 Chronic venous insufficiency of lower extremity  I25.10, I25.84 Coronary atherosclerosis due to calcified coronary lesion  I10 Primary hypertension  E78.2 Mixed " hyperlipidemia    IMPRESSION:  - Considered diastolic heart failure as potential cause of shortness of breath despite normal EF.  - Further cardiac testing may be needed to rule out arterial stenosis.    CHRONIC DIASTOLIC HEART FAILURE:  - Continued Jardiance 10 mg daily.  - Ordered labs to check heart and renal function.  - Ordered XR Chest.  - Follow up in about 6 weeks to review test results and assess progress.    SOB (SHORTNESS OF BREATH):  - Continued Singulair.  - Ordered XR Chest.  - Follow up in about 6 weeks to review test results and assess progress.    CORONARY ATHEROSCLEROSIS DUE TO CALCIFIED CORONARY LESION:  - Continued Metoprolol 25 mg daily for heart rate control.  - Ordered labs to check heart function.  - Follow up in about 6 weeks to review test results and assess progress.    PRIMARY HYPERTENSION:  - Continued Metoprolol 25 mg daily for blood pressure control.  - Continued Irbesartan 300 mg daily for blood pressure control.  - Follow up in about 6 weeks to review test results and assess progress.    MIXED HYPERLIPIDEMIA:  - Continued Atorvastatin 80 mg daily for cholesterol.  - Follow up in about 6 weeks to review test results and assess progress.        Patient with shortness of breath, there certainly may be a anxiety component in this.  Her cardiac workup including echocardiography done recently, a BNP and other lab are normal.  Diastolic dysfunction does not appear to be present or ECHO.    She could have coronary artery disease, a PET stress test will be obtained.  Otherwise, would continue to follow-up on a regular basis.  Follow up in about 6 weeks (around 7/4/2025).        Future Appointments   Date Time Provider Department Center   7/10/2025  9:40 AM Arturo Turcios Jr., MD [facility] CARDIO [place]       This note was generated with the assistance of ambient listening technology. Verbal consent was obtained by the patient and accompanying visitor(s) for the recording of patient  appointment to facilitate this note. I attest to having reviewed and edited the generated note for accuracy, though some syntax or spelling errors may persist. Please contact the author of this note for any clarification.                      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    Mixed hyperlipidemia    Obesity    Subdural hematoma    Primary hypertension    Preoperative clearance    Venous insufficiency    Chronic diastolic heart failure    Atherosclerosis of aorta    Abnormal EKG    Age-related osteoporosis without current pathological fracture    Arthritis of knee, left    Bronchiectasis, uncomplicated    Chronic diarrhea    Chronic venous insufficiency of lower extremity    Compression fracture of T12 vertebra    Coronary atherosclerosis due to calcified coronary lesion    Lymphedema, not elsewhere classified    Mild persistent asthma without complication    Prediabetes    Stress incontinence (female) (male)   [2]   Current Outpatient Medications:     albuterol (PROVENTIL/VENTOLIN HFA) 90 mcg/actuation inhaler, Inhale 2 puffs into the lungs every 6 (six) hours as needed., Disp: , Rfl:     alendronate (FOSAMAX) 70 MG tablet, Take 70 mg by mouth every 7 days., Disp: , Rfl:     ARIPiprazole (ABILIFY) 5 MG Tab, Take 5 mg by mouth., Disp: , Rfl:     atorvastatin (LIPITOR) 80 MG tablet, Take 80 mg by mouth., Disp: , Rfl:     empagliflozin (JARDIANCE) 10 mg tablet, Take 1 tablet (10 mg total) by mouth once daily., Disp: 90 tablet, Rfl: 3    folic acid/multivit-min/lutein (CENTRUM SILVER ORAL), Take by mouth., Disp: , Rfl:     irbesartan (AVAPRO) 300 MG tablet, Take 300 mg by mouth once daily., Disp: , Rfl:     metoprolol succinate (TOPROL-XL) 25 MG 24 hr tablet, Take 25 mg by mouth once daily., Disp: , Rfl:     montelukast (SINGULAIR) 10 mg tablet, Take 10 mg by mouth., Disp: , Rfl:     omeprazole (PRILOSEC) 40 MG capsule,  Take 40 mg by mouth once daily., Disp: , Rfl:     sertraline (ZOLOFT) 100 MG tablet, Take 100 mg by mouth once daily., Disp: , Rfl:     SYMBICORT 80-4.5 mcg/actuation HFAA, INHALE 2 PUFFS PO INTO LUNGS BID TO PREVENT ASTHMA, Disp: , Rfl: 5

## 2025-06-10 ENCOUNTER — TELEPHONE (OUTPATIENT)
Dept: CARDIOLOGY | Facility: CLINIC | Age: 85
End: 2025-06-10
Payer: MEDICARE

## 2025-06-10 NOTE — TELEPHONE ENCOUNTER
"----- Message from Arturo Turcios MD sent at 6/9/2025  8:23 PM CDT -----  Regarding: RE: Results  My note to her from 5/23: "Chest x-ray shows significant curvature of the spine, scoliosis.  That can certainly cause shortness of breath.  There is not any fluid accumulation. The pulmonary nodule appears to have been present on prior chest x-rays from Dr. Peña."  ----- Message -----  From: Haylee Aguilar MA  Sent: 6/9/2025   4:21 PM CDT  To: Arturo Turcios Jr., MD  Subject: FW: Results                                      Dr. Turcios,Patient is asking for CXR resultsThank you  ----- Message -----  From: Gypsy Salazar MA  Sent: 6/9/2025   3:41 PM CDT  To: Tam Morris Staff  Subject: Results                                          Pt is calling for results on her EKG and can be reached at [phone].Thank you.  "

## 2025-07-10 ENCOUNTER — OFFICE VISIT (OUTPATIENT)
Dept: CARDIOLOGY | Facility: CLINIC | Age: 85
End: 2025-07-10
Payer: MEDICARE

## 2025-07-10 DIAGNOSIS — I25.84 CORONARY ATHEROSCLEROSIS DUE TO CALCIFIED CORONARY LESION: ICD-10-CM

## 2025-07-10 DIAGNOSIS — I25.10 CORONARY ATHEROSCLEROSIS DUE TO CALCIFIED CORONARY LESION: ICD-10-CM

## 2025-07-10 DIAGNOSIS — I10 PRIMARY HYPERTENSION: ICD-10-CM

## 2025-07-10 DIAGNOSIS — I50.32 CHRONIC DIASTOLIC HEART FAILURE: Primary | ICD-10-CM

## 2025-07-10 DIAGNOSIS — E78.2 MIXED HYPERLIPIDEMIA: ICD-10-CM

## 2025-07-10 PROCEDURE — 1101F PT FALLS ASSESS-DOCD LE1/YR: CPT | Mod: CPTII,S$GLB,, | Performed by: INTERNAL MEDICINE

## 2025-07-10 PROCEDURE — G2211 COMPLEX E/M VISIT ADD ON: HCPCS | Mod: S$GLB,,, | Performed by: INTERNAL MEDICINE

## 2025-07-10 PROCEDURE — 99999 PR PBB SHADOW E&M-EST. PATIENT-LVL III: CPT | Mod: PBBFAC,,, | Performed by: INTERNAL MEDICINE

## 2025-07-10 PROCEDURE — 1159F MED LIST DOCD IN RCRD: CPT | Mod: CPTII,S$GLB,, | Performed by: INTERNAL MEDICINE

## 2025-07-10 PROCEDURE — 99214 OFFICE O/P EST MOD 30 MIN: CPT | Mod: S$GLB,,, | Performed by: INTERNAL MEDICINE

## 2025-07-10 PROCEDURE — 3288F FALL RISK ASSESSMENT DOCD: CPT | Mod: CPTII,S$GLB,, | Performed by: INTERNAL MEDICINE

## 2025-07-10 NOTE — PROGRESS NOTES
Subjective:   07/10/2025     Patient ID:  Merle Borden is a 85 y.o. female who presents for evaulation of 3 month F/U       History of Present Illness    CHIEF COMPLAINT:  Patient presents for follow-up to discuss chronic heart failure management and recent test results.    HPI:  Patient is being followed for diastolic chronic heart failure. She was previously treated with empagliflozin, which was increased to 10 mg daily. She reports redness in the genital area and has an appointment scheduled with a gynecologist on the 23rd. She denies burning or pain in the genital area.    She reports mild swelling of lower extremities but denies significant swelling. She mentions having had a CT of her chest and stomach ordered by her PCP, which showed scar tissue. She is uncertain about the origin of this scar tissue, as she has not had any operations.    She reports receiving injections for arthritis in her right hip. She mentions having had bilateral knee replacements and a left pelvis replacement, but not a hip replacement. Her doctor is treating the hip arthritis with joint injections.    She has been having dyspnea, which may be related to multiple factors including spinal abnormalities and bronchiectasis, as well as her heart condition.    CARDIAC HISTORY:  CT chest 7/25: Scarring in both lung bases L>R, significant dextral convex curvature thoracic spine w/ degenerative changes, loss of vertebral height  Echo 5/6/25: Normal LV function, mild TR, no PH, normal diastolic function, PASP 25 mmHg  CXR: L upper lung nodule, present on prior XR  Bronchiectasis in both lower lobes w/ scarring L>R  Compression of T11 vertebral body w/ collapse and curvature CAD due to calcified coronary lesion    MEDICATIONS:  Jardiance (empagliflozin) 10 mg daily, for diastolic chronic heart failure  Metoprolol, for HTN  Irbesartan, for HTN  Atorvastatin 80 mg, for HLD    TEST RESULTS:  Patient's recent lab results from 5/25 show a  hemoglobin level of 15, creatinine of 0.62, GFR of 89, sodium of 141, potassium of 4.3, and MPA of 75. Her recent A1C was mildly elevated, and her LDL cholesterol was 86. An ECG was scheduled for the patient on 5/23/25, with the machine ready for the procedure.    IMAGING:  A recent CT of the chest and abdomen revealed compression of the T11 vertebral body, with some collapse and curvature.    MEDICAL HISTORY:  Patient has a history of diabetes and arthritis in her right hip.    SURGICAL HISTORY:  She has undergone bilateral knee replacement and left hip replacement surgeries.      ROS:  General: -fever, -chills, -fatigue, -weight gain, -weight loss  Eyes: -vision changes, -redness, -discharge  ENT: -ear pain, -nasal congestion, -sore throat  Cardiovascular: -chest pain, -palpitations, +lower extremity edema  Respiratory: -cough, +shortness of breath  Gastrointestinal: -abdominal pain, -nausea, -vomiting, -diarrhea, -constipation, -blood in stool  Genitourinary: -dysuria, -hematuria, -frequency  Musculoskeletal: +joint pain, -muscle pain  Skin: -rash, -lesion  Neurological: -headache, -dizziness, -numbness, -tingling  Psychiatric: -anxiety, -depression, -sleep difficulty          Problem List[1]     Review of patient's allergies indicates:   Allergen Reactions    Codeine Nausea And Vomiting and Other (See Comments)     Upset stomach       Current Medications[2]     Objective:   Physical Exam    General: No acute distress. Well-developed. Well-nourished.  Eyes: EOMI. Sclerae anicteric.  HENT: Normocephalic. Atraumatic. Nares patent. Moist oral mucosa.  Cardiovascular: Regular rate. Regular rhythm. No murmurs. No rubs. No gallops. Normal S1, S2.  Respiratory: Normal respiratory effort. Clear to auscultation bilaterally. No rales. No rhonchi. No wheezing.  Musculoskeletal: No  obvious deformity.  Extremities: Mild edema in legs.  Neurological: Alert & oriented x3. No slurred speech. Normal gait.  Psychiatric: Normal  "mood. Normal affect. Good insight. Good judgment.  Skin: Warm. Dry. No rash.          Vitals:    07/10/25 0936   BP: (P) 125/72   Pulse: (P) 86     Wt Readings from Last 3 Encounters:   07/10/25 (P) 63.6 kg (140 lb 3.4 oz)   05/23/25 65.6 kg (144 lb 10 oz)   04/10/25 66.8 kg (147 lb 4.3 oz)     Temp Readings from Last 3 Encounters:   05/23/16 97.6 °F (36.4 °C) (Oral)   10/06/14 97.1 °F (36.2 °C) (Oral)     BP Readings from Last 3 Encounters:   07/10/25 (P) 125/72   05/23/25 130/74   04/10/25 124/69     Pulse Readings from Last 3 Encounters:   07/10/25 (P) 86   05/23/25 84   04/10/25 89               No results found for: "CHOL"  No results found for: "HDL"  No results found for: "LDLCALC"  Lab Results   Component Value Date    ALT 18 06/16/2025    AST 18 06/16/2025    AST 15 12/19/2024    AST 23 05/01/2024     Lab Results   Component Value Date    CREATININE 0.62 06/16/2025    BUN 15 06/16/2025     06/16/2025    K 4.0 06/16/2025    CO2 28 06/16/2025    CO2 29 12/19/2024    CO2 28 05/01/2024     No results found for: "HGB", "HCT"    Assessment and Plan:   Assessment & Plan    IMPRESSION:  - Normal LV function, mild tricuspid regurgitation, and normal diastolic function on echocardiogram.  - Chest XR showed no evidence of fluid accumulation.  - Current treatment plan for diastolic-like heart failure, HTN, and HLD remains appropriate.  - Discussed that shortness of breath may be exacerbated by spinal abnormalities affecting breathing mechanics.    HEART DISEASE:  - Continued Jardiance for heart health.  - Continued Metoprolol for heart health.  - Ordered rescheduling of PET stress test.  - Ordered cardiac MRI.  - Explained that Jardiance, while beneficial for heart health, can increase risk of urinary tract infections due to sugar in urine.    HYPERLIPIDEMIA:  - Continued Atorvastatin 80 mg for cholesterol.    HYPERTENSION:  - Continued Irbesartan for blood pressure.    FOLLOW-UP CARE:  - Continue follow-up with " Dr. Dorantes and nurse practitioners in office.          Follow up in about 6 months (around 1/10/2026).        Future Appointments   Date Time Provider Department Center   7/23/2025  1:30 PM Aston Garcia III, MD [facility] OBGYN [place]   11/24/2025 10:00 AM CARDIAC, PET IMAGING [facility] [place]   1/9/2026  9:40 AM Arturo Turcios Jr., MD [facility] [place]       This note was generated with the assistance of ambient listening technology. Verbal consent was obtained by the patient and accompanying visitor(s) for the recording of patient appointment to facilitate this note. I attest to having reviewed and edited the generated note for accuracy, though some syntax or spelling errors may persist. Please contact the author of this note for any clarification.                      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    Mixed hyperlipidemia    Obesity    Subdural hematoma    Primary hypertension    Preoperative clearance    Venous insufficiency    Chronic diastolic heart failure    Atherosclerosis of aorta    Abnormal EKG    Age-related osteoporosis without current pathological fracture    Arthritis of knee, left    Bronchiectasis, uncomplicated    Chronic diarrhea    Chronic venous insufficiency of lower extremity    Compression fracture of T12 vertebra    Coronary atherosclerosis due to calcified coronary lesion    Lymphedema, not elsewhere classified    Mild persistent asthma without complication    Prediabetes    Stress incontinence (female) (male)   [2]   Current Outpatient Medications:     albuterol (PROVENTIL/VENTOLIN HFA) 90 mcg/actuation inhaler, Inhale 2 puffs into the lungs every 6 (six) hours as needed., Disp: , Rfl:     alendronate (FOSAMAX) 70 MG tablet, Take 70 mg by mouth every 7 days., Disp: , Rfl:     ARIPiprazole (ABILIFY) 5 MG Tab, Take 5 mg by mouth., Disp: , Rfl:     atorvastatin (LIPITOR) 80  MG tablet, Take 80 mg by mouth., Disp: , Rfl:     empagliflozin (JARDIANCE) 10 mg tablet, Take 1 tablet (10 mg total) by mouth once daily., Disp: 90 tablet, Rfl: 3    folic acid/multivit-min/lutein (CENTRUM SILVER ORAL), Take by mouth., Disp: , Rfl:     irbesartan (AVAPRO) 300 MG tablet, Take 300 mg by mouth once daily., Disp: , Rfl:     metoprolol succinate (TOPROL-XL) 25 MG 24 hr tablet, Take 25 mg by mouth once daily., Disp: , Rfl:     montelukast (SINGULAIR) 10 mg tablet, Take 10 mg by mouth., Disp: , Rfl:     omeprazole (PRILOSEC) 40 MG capsule, Take 40 mg by mouth once daily., Disp: , Rfl:     sertraline (ZOLOFT) 100 MG tablet, Take 100 mg by mouth once daily., Disp: , Rfl:     SYMBICORT 80-4.5 mcg/actuation HFAA, INHALE 2 PUFFS PO INTO LUNGS BID TO PREVENT ASTHMA, Disp: , Rfl: 5

## 2025-07-21 ENCOUNTER — TELEPHONE (OUTPATIENT)
Dept: OBSTETRICS AND GYNECOLOGY | Facility: CLINIC | Age: 85
End: 2025-07-21
Payer: MEDICARE

## 2025-07-23 ENCOUNTER — OFFICE VISIT (OUTPATIENT)
Dept: OBSTETRICS AND GYNECOLOGY | Facility: CLINIC | Age: 85
End: 2025-07-23
Payer: MEDICARE

## 2025-07-23 VITALS
WEIGHT: 139.13 LBS | DIASTOLIC BLOOD PRESSURE: 70 MMHG | SYSTOLIC BLOOD PRESSURE: 122 MMHG | HEIGHT: 61 IN | BODY MASS INDEX: 26.27 KG/M2

## 2025-07-23 DIAGNOSIS — Z12.31 ENCOUNTER FOR SCREENING MAMMOGRAM FOR MALIGNANT NEOPLASM OF BREAST: ICD-10-CM

## 2025-07-23 DIAGNOSIS — Z01.419 WELL WOMAN EXAM WITH ROUTINE GYNECOLOGICAL EXAM: Primary | ICD-10-CM

## 2025-07-23 DIAGNOSIS — Z12.39 ENCOUNTER FOR BREAST CANCER SCREENING OTHER THAN MAMMOGRAM: ICD-10-CM

## 2025-07-23 DIAGNOSIS — N39.3 STRESS INCONTINENCE (FEMALE) (MALE): ICD-10-CM

## 2025-07-23 DIAGNOSIS — Z78.0 POST-MENOPAUSAL: ICD-10-CM

## 2025-07-23 DIAGNOSIS — N81.10 CYSTOCELE, UNSPECIFIED: ICD-10-CM

## 2025-07-23 DIAGNOSIS — N95.2 POSTMENOPAUSAL ATROPHIC VAGINITIS: ICD-10-CM

## 2025-07-23 PROCEDURE — 99999 PR PBB SHADOW E&M-EST. PATIENT-LVL III: CPT | Mod: PBBFAC,,, | Performed by: OBSTETRICS & GYNECOLOGY

## 2025-07-23 PROCEDURE — 87102 FUNGUS ISOLATION CULTURE: CPT | Performed by: OBSTETRICS & GYNECOLOGY

## 2025-07-23 RX ORDER — TRIAMCINOLONE ACETONIDE 1 MG/G
OINTMENT TOPICAL
Qty: 80 G | Refills: 3 | Status: SHIPPED | OUTPATIENT
Start: 2025-07-23

## 2025-07-23 NOTE — PROGRESS NOTES
Subjective:     Patient ID: Merle Borden is a 85 y.o. female.     Chief Complaint: Well Woman     History of Present Illness: This patient is a 85 y.o.  female, who presents to the GYN clinic for her gyn well woman yearly exam.  She denies gyn complaints      No LMP recorded. Patient has had a hysterectomy.    Review of Systems    GENERAL: No fever, chills, fatigability or weightchange  SKIN: No rashes, itching or changes in color or texture of skin.  HEAD: No headaches or recent head trauma.  EYES: Visual acuity fine. No photophobia,r diplopia.  EARS: Denies earache or vertigo  NOSE: No loss of smell, no epistaxis or postnasal drip.  MOUTH & THROAT: No hoarseness or change in voice.   NODES: Denies swollen glands.  CHEST: Denies WHEAT, cyanosis, wheezing, cough and sputum production.  CARDIOVASCULAR: Denies chest pain, PND, orthopnea or reduced exercise tolerance.  ABDOMEN: Appetite fine. No weight loss. bloating, Denies diarrhea, abdominal pain, hematemesis or blood in stool.  URINARY: No flank pain, dysuria or hematuria.  PERIPHERAL VASCULAR: No claudication or cyanosis.Varicosities  MUSCULOSKELETAL: No joint stiffness or swelling. Denies back pain.muscle aches  NEUROLOGIC: No history of seizures, paralysis, alteration of gait or coordination.       Objective:       Physical Exam     APPEARANCE: Well nourished, well developed, in no acute distress.    GENITOURINARY:  Vulva:  The vulva is red and swollen secondary to incontinence in irritation from urine. Normal female genital architecture.  No ulcerations noted.  Urethral Meatus: Normal size and location, no lesions, no prolapse.  Urethra: No masses, tenderness, prolapse or scarring.  Vagina: thin, atrophic and with decreased rugae, no discharge, midline cystocele noted, small rectocele no  Cervix: Absent  Uterus: Absent  Adnexa: No masses, tenderness or CDS nodularity.  Anus Perineum: No lesions, no relaxation, no external hemorrhoids.  Breasts:  Symmetrical, no skin changes or visible lesions. No palpable masses, nipple discharge or adenopathy bilaterally.  Abdomen: No masses, tenderness, hernia or ascites, no hepatasplenomegaly  Neck: Supple. Symmetric without masses. No thyromegaly.  Skin: No rashes, lesions, ulcers, acne, hirsutism.  Respiratory: Breath sounds clear bilateraly. Good air movement. No rales. No wheezes.  Cardiovascular: Normal rate. Regular rhythm.  Peripheral/lower extremities: No edema, erythema or tenderness.  Lymphatic: No axillary, neck or groin nodes palp.  Mental Status: Alert, oriented x 3, normal affect and mood.    @PROCEDURE:@           Assessment:      1. Well woman exam with routine gynecological exam    2. Post-menopausal    3. Postmenopausal atrophic vaginitis    4. Cystocele, unspecified    5. Stress incontinence (female) (male)    6. Encounter for breast cancer screening other than mammogram               Plan:  Apply Kenalog ointment once a day for a week, every other day for another week, then twice weekly.              No orders of the defined types were placed in this encounter.

## 2025-07-24 ENCOUNTER — TELEPHONE (OUTPATIENT)
Dept: OBSTETRICS AND GYNECOLOGY | Facility: CLINIC | Age: 85
End: 2025-07-24
Payer: MEDICARE

## 2025-08-06 ENCOUNTER — RESULTS FOLLOW-UP (OUTPATIENT)
Dept: OBSTETRICS AND GYNECOLOGY | Facility: CLINIC | Age: 85
End: 2025-08-06
Payer: MEDICARE

## 2025-08-06 RX ORDER — FLUCONAZOLE 200 MG/1
TABLET ORAL
Qty: 3 TABLET | Refills: 0 | Status: SHIPPED | OUTPATIENT
Start: 2025-08-06

## 2025-08-29 ENCOUNTER — TELEPHONE (OUTPATIENT)
Dept: CARDIOLOGY | Facility: CLINIC | Age: 85
End: 2025-08-29
Payer: MEDICARE